# Patient Record
Sex: FEMALE | Race: WHITE
[De-identification: names, ages, dates, MRNs, and addresses within clinical notes are randomized per-mention and may not be internally consistent; named-entity substitution may affect disease eponyms.]

---
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
## 2022-01-08 ENCOUNTER — HOSPITAL ENCOUNTER (EMERGENCY)
Dept: HOSPITAL 47 - EC | Age: 22
Discharge: HOME | End: 2022-01-08
Payer: COMMERCIAL

## 2022-01-08 VITALS
TEMPERATURE: 98.7 F | RESPIRATION RATE: 17 BRPM | HEART RATE: 97 BPM | DIASTOLIC BLOOD PRESSURE: 88 MMHG | SYSTOLIC BLOOD PRESSURE: 129 MMHG

## 2022-01-08 DIAGNOSIS — O26.892: ICD-10-CM

## 2022-01-08 DIAGNOSIS — J45.909: ICD-10-CM

## 2022-01-08 DIAGNOSIS — O98.512: Primary | ICD-10-CM

## 2022-01-08 DIAGNOSIS — U07.1: ICD-10-CM

## 2022-01-08 DIAGNOSIS — Z91.018: ICD-10-CM

## 2022-01-08 DIAGNOSIS — Z3A.26: ICD-10-CM

## 2022-01-08 DIAGNOSIS — M54.9: ICD-10-CM

## 2022-01-08 DIAGNOSIS — O99.512: ICD-10-CM

## 2022-01-08 PROCEDURE — 99283 EMERGENCY DEPT VISIT LOW MDM: CPT

## 2022-01-08 PROCEDURE — 87635 SARS-COV-2 COVID-19 AMP PRB: CPT

## 2022-01-08 NOTE — ED
URI HPI





- General


Chief Complaint: Upper Respiratory Infection


Stated Complaint: Upper Respiratory Symptoms, Back Pain


Time Seen by Provider: 01/08/22 19:59


Source: patient, RN notes reviewed


Mode of arrival: ambulatory


Limitations: no limitations





- History of Present Illness


Initial Comments: 





Patient is a 21-year-old female that presents to the emergency department 

complaining of upper respiratory tract symptoms for several days.  She notes she

is approximately 26 weeks pregnant and wanted to get tested for Covid.  She was 

well-appearing stated that her symptoms are very mild.  She was otherwise in no 

distress.  She denied any chest pain shortness of breath headache nausea 

vomiting diarrhea constipation fatigue chills.





- Related Data


                                Home Medications











 Medication  Instructions  Recorded  Confirmed


 


Pnv,Calcium 72/Iron/Folic Acid 1 tab PO HS 04/18/21 12/23/21





[Prenatal Plus Tablet]   


 


Ondansetron [Zofran ODT] 4 mg PO TID PRN 10/07/21 12/23/21











                                    Allergies











Allergy/AdvReac Type Severity Reaction Status Date / Time


 


poppyseed oil Allergy  Dyspnea Verified 01/08/22 18:39














Review of Systems


ROS Statement: 


Those systems with pertinent positive or pertinent negative responses have been 

documented in the HPI.





ROS Other: All systems not noted in ROS Statement are negative.





Past Medical History


Past Medical History: Asthma, GERD/Reflux, Musculoskeletal Disorder, Osteoart

hritis (OA), Pneumonia


Additional Past Medical History / Comment(s): Hx Pneumonia. Degenerative Disc 

Disease.


History of Any Multi-Drug Resistant Organisms: None Reported


Past Surgical History: Adenoidectomy, Cholecystectomy, Tonsillectomy


Additional Past Surgical History / Comment(s): [place] teeth removed, ganglion 

cyst on left hand removed. D&C


Past Anesthesia/Blood Transfusion Reactions: Postoperative Nausea & Vomiting 

(PONV)


Past Psychological History: No Psychological Hx Reported


Smoking Status: Never smoker


Past Alcohol Use History: None Reported


Past Drug Use History: None Reported





- Past Family History


  ** Mother


Family Medical History: No Reported History





General Exam


Limitations: no limitations


General appearance: alert, in no apparent distress


Head exam: Present: atraumatic, normocephalic, normal inspection


Eye exam: Present: normal appearance, PERRL, EOMI.  Absent: scleral icterus, 

conjunctival injection, periorbital swelling


ENT exam: Present: normal exam, mucous membranes moist


Neck exam: Present: normal inspection


Respiratory exam: Present: normal lung sounds bilaterally.  Absent: respiratory 

distress, wheezes, rales, rhonchi, stridor


Cardiovascular Exam: Present: regular rate, normal rhythm, normal heart sounds. 

Absent: systolic murmur, diastolic murmur, rubs, gallop, clicks


Extremities exam: Present: normal inspection, full ROM, normal capillary refill.

 Absent: tenderness, pedal edema, joint swelling, calf tenderness


Neurological exam: Present: alert, oriented X3


Psychiatric exam: Present: normal affect, normal mood


Skin exam: Present: warm, dry, intact, normal color.  Absent: rash





Course





                                   Vital Signs











  01/08/22





  18:37


 


Temperature 97.8 F


 


Pulse Rate 92


 


Respiratory 20





Rate 


 


Blood Pressure 114/75


 


O2 Sat by Pulse 100





Oximetry 














Medical Decision Making





- Medical Decision Making





21-year-old female complaining of upper respiratory tract symptoms for several 

days.


Covid test ordered and is positive.


Patient does meet criteria due to pregnancy.


Patient was informed monoclonal antibodies and wishes to go home and think on 

it.  She notes she is tired and wants to rest for tonight.


Case discussed with Dr. Valdez.





- Lab Data





                                   Lab Results











  01/08/22 Range/Units





  18:40 


 


Coronavirus (PCR)  Detected A  (Not Detectd)  














Disposition


Clinical Impression: 


 COVID





Disposition: HOME SELF-CARE


Condition: Stable


Instructions (If sedation given, give patient instructions):  Coronavirus 

Disease 2019 (COVID-19)


Additional Instructions: 


Please return to the Emergency Department if symptoms worsen or any other 

concerns.


Follow-up with primary care 1-2 days.


Please return if you do decide to do monoclonal antibodies.





Is patient prescribed a controlled substance at d/c from ED?: No


Referrals: 


Damian Roldan DO [Primary Care Provider] - 1-2 days


Time of Disposition: 20:12

## 2022-01-12 ENCOUNTER — HOSPITAL ENCOUNTER (OUTPATIENT)
Dept: HOSPITAL 47 - FBPOP | Age: 22
Discharge: HOME | End: 2022-01-12
Attending: OBSTETRICS & GYNECOLOGY
Payer: COMMERCIAL

## 2022-01-12 VITALS
HEART RATE: 83 BPM | TEMPERATURE: 96.9 F | RESPIRATION RATE: 16 BRPM | SYSTOLIC BLOOD PRESSURE: 131 MMHG | DIASTOLIC BLOOD PRESSURE: 74 MMHG

## 2022-01-12 DIAGNOSIS — Z3A.26: ICD-10-CM

## 2022-01-12 DIAGNOSIS — O26.892: Primary | ICD-10-CM

## 2022-01-12 LAB
PH UR: 6.5 [PH] (ref 5–8)
RBC UR QL: 1 /HPF (ref 0–5)
SP GR UR: 1.01 (ref 1–1.03)
SQUAMOUS UR QL AUTO: 1 /HPF (ref 0–4)
UROBILINOGEN UR QL STRIP: <2 MG/DL (ref ?–2)
WBC #/AREA URNS HPF: 2 /HPF (ref 0–5)

## 2022-01-12 PROCEDURE — 99213 OFFICE O/P EST LOW 20 MIN: CPT

## 2022-01-12 PROCEDURE — 81001 URINALYSIS AUTO W/SCOPE: CPT

## 2022-01-21 ENCOUNTER — HOSPITAL ENCOUNTER (OUTPATIENT)
Dept: HOSPITAL 47 - FBPOP | Age: 22
Discharge: HOME | End: 2022-01-21
Attending: OBSTETRICS & GYNECOLOGY
Payer: COMMERCIAL

## 2022-01-21 VITALS
RESPIRATION RATE: 16 BRPM | SYSTOLIC BLOOD PRESSURE: 127 MMHG | DIASTOLIC BLOOD PRESSURE: 77 MMHG | TEMPERATURE: 98 F | HEART RATE: 90 BPM

## 2022-01-21 DIAGNOSIS — H53.40: ICD-10-CM

## 2022-01-21 DIAGNOSIS — O99.891: Primary | ICD-10-CM

## 2022-01-21 DIAGNOSIS — Z3A.28: ICD-10-CM

## 2022-01-21 LAB
PH UR: 7 [PH] (ref 5–8)
RBC UR QL: <1 /HPF (ref 0–5)
SP GR UR: 1.02 (ref 1–1.03)
SQUAMOUS UR QL AUTO: 6 /HPF (ref 0–4)
UROBILINOGEN UR QL STRIP: <2 MG/DL (ref ?–2)
WBC #/AREA URNS HPF: 1 /HPF (ref 0–5)

## 2022-01-21 PROCEDURE — 59025 FETAL NON-STRESS TEST: CPT

## 2022-01-21 PROCEDURE — 99213 OFFICE O/P EST LOW 20 MIN: CPT

## 2022-01-21 PROCEDURE — 81001 URINALYSIS AUTO W/SCOPE: CPT

## 2022-01-27 NOTE — P.MSEPDOC
Presenting Problems





- Arrival Data


Date of Arrival on Unit: 22


Time of Arrival on Unit: 19:39


Mode of Transport: Ambulatory





- Complaint


OB-Reason for Admission/Chief Complaint: Pain


Comment: Lower abdominal pain (ligament)





Prenatal Medical History





- Pregnancy Information


: 2


Para: 0


Term: 0


: 0


Abortions: Spontaneous or Elective: 1


Number of Living Children: 0





- Gestational Age


Gestational Age by TOM (wks/days): 27 Weeks and 0 Days





- Prenatal History


Comment: Covid + on 22 had antibody infusion and history of SVT





Review of Systems





- Review of Systems


Constitutional: No problems


Breast: No problems


ENT: Cough, Nasal congestion


Cardiovascular: Irregular heartbeat


Respiratory: No problems


Gastrointestinal: No problems


Genitourinary: No problems


Musculoskeletal: No problems


Neurological: No problems


Skin: No problems





Vital Signs





- Temperature


Temperature: 96.9 F


Temperature Source: Temporal Artery Scan





- Pulse


  ** Radial


Pulse Rate: 83


Pulse Assessment Method: Automatic Cuff





- Respirations


Respiratory Rate: 16


Oxygen Delivery Method: Room Air


O2 Sat by Pulse Oximetry: 99





- Blood Pressure


  ** Right Arm Supine


Blood Pressure: 131/74


Blood Pressure Mean: 93


Blood Pressure Source: Automatic Cuff





Medical Screen Scoring





- Cervical Exam


Dilation (cm): 0


Effacement (%): 50


Station: -4


Membranes: Intact





- Fetal Assessment - Baby A


Baseline FHR: 135


Fetal Heart Rate - NICHD Category: Category I (Normal)


NST: Reactive





Physician Notification





- Physician Notified


Physician Notified Date: 22


Physician Notified Time: 20:25


Physician: Hipolito Alcaraz Order Received: Yes





- Notification Comment


Comment: Obtain FFN





Maternal Fetal Triage Index





- Maternal Fetal Triage Index


Presenting for scheduled procedure w/no complaint: No





- Stat/Priority 1


Stat Priority 1: No





- Urgent/Priority 2


Urgent Priority 2: No





- Prompt/Priority 3


Prompt Priority 3: No





- Non-Urgent/Priority 4


Non-Urgent Priority 4: Yes


Criteria Met for Priority 4: Common discomfort of pregnancy ligament pain





Disposition





- Disposition


OB Disposition: Discharge to home


Discharge Date: 22


Discharge Time: 20:50


I agree with the RN Medical Screening Exam: Yes


Case reviewed; plan agreed upon as documented in EMR&OBIX.: Yes


Diagnosis: PREG CARE FOR PATIENT W RECUR PREG LOSS, SECOND TRIMESTER

## 2022-01-30 ENCOUNTER — HOSPITAL ENCOUNTER (OUTPATIENT)
Dept: HOSPITAL 47 - FBPOP | Age: 22
Discharge: HOME | End: 2022-01-30
Attending: OBSTETRICS & GYNECOLOGY
Payer: COMMERCIAL

## 2022-01-30 VITALS
DIASTOLIC BLOOD PRESSURE: 75 MMHG | HEART RATE: 94 BPM | SYSTOLIC BLOOD PRESSURE: 122 MMHG | TEMPERATURE: 98.1 F | RESPIRATION RATE: 16 BRPM

## 2022-01-30 DIAGNOSIS — O26.853: Primary | ICD-10-CM

## 2022-01-30 DIAGNOSIS — Z3A.29: ICD-10-CM

## 2022-01-30 PROCEDURE — 99213 OFFICE O/P EST LOW 20 MIN: CPT

## 2022-01-30 PROCEDURE — 59025 FETAL NON-STRESS TEST: CPT

## 2022-01-31 NOTE — P.MSEPDOC
Presenting Problems





- Arrival Data


Date of Arrival on Unit: 22


Time of Arrival on Unit: 17:15


Mode of Transport: Ambulatory





- Complaint


OB-Reason for Admission/Chief Complaint: Other


Comment: mucusy dischagre. "bleeding" after sharp pain.





Prenatal Medical History





- Pregnancy Information


: 2


Para: 0


Term: 0


: 0


Abortions: Spontaneous or Elective: 0


Number of Living Children: 0





- Gestational Age


Gestational Age by TOM (wks/days): 29 Weeks and 4 Days





Review of Systems





- Review of Systems


Constitutional: No problems


Breast: No problems


ENT: No problems


Cardiovascular: No problems


Respiratory: No problems


Gastrointestinal: No problems


Genitourinary: No problems


Musculoskeletal: No problems


Neurological: No problems


Skin: No problems


Comment: history of svt's to see cardialogy in march





Vital Signs





- Temperature


Temperature: 98.1 F


Temperature Source: Temporal Artery Scan





- Pulse


  ** Apical


Pulse Rate: 94


Pulse Assessment Method: Automatic Cuff





- Respirations


Respiratory Rate: 16


Oxygen Delivery Method: Room Air


O2 Sat by Pulse Oximetry: 98





- Blood Pressure


  ** Right Arm


Blood Pressure: 122/75


Blood Pressure Mean: 90


Blood Pressure Source: Automatic Cuff





Medical Screen Scoring





- Cervical Exam


Dilation (cm): 0





- Uterine Contractions


Frequency From (mins): 0





- Fetal Assessment - Baby A


Baseline FHR: 140


Fetal Heart Rate - NICHD Category: Category I (Normal)





Physician Notification





- Physician Notified


Physician Notified Date: 22


Physician Notified Time: 18:15


Physician: Yuliya Howe Order Received: Yes





- Notification Comment


Comment: may discharge home with bed rest. seeing dr handley in am. to return if 

conserns or problems





Maternal Fetal Triage Index





- Non-Urgent/Priority 4


Non-Urgent Priority 4: Yes


Criteria Met for Priority 4:  called dr howe report given on pt c/o. 

brownish mucus pad brought in. size of quarter. abd soft non tender to touch. no

bleeding noted. no leaking fluid. vag. exam= closed thick posterior. no 

discharge noted on glove. reactive nst.





Disposition





- Disposition


OB Disposition: Physician follow up in office, Discharge to home, Written follow

up instructions reviewed


Discharge Date: 22


Discharge Time: 18:20


I agree with the RN Medical Screening Exam: Yes


Case reviewed; plan agreed upon as documented in EMR&OBIX.: Yes


Diagnosis: SPOTTING COMPLICATING PREGNANCY, THIRD TRIMESTER

## 2022-02-03 NOTE — P.MSEPDOC
Presenting Problems





- Arrival Data


Date of Arrival on Unit: 22


Time of Arrival on Unit: 12:51


Mode of Transport: Ambulatory





- Complaint


OB-Reason for Admission/Chief Complaint: Visual Disturbances





Prenatal Medical History





- Pregnancy Information


: 3


Para: 0


Term: 0


: 0


Abortions: Spontaneous or Elective: 0


Number of Living Children: 0





- Gestational Age


Gestational Age by TOM (wks/days): 28 Weeks and 2 Days





Review of Systems





- Review of Systems


Constitutional: No problems


Breast: No problems


ENT: No problems


Cardiovascular: No problems


Respiratory: No problems


Gastrointestinal: No problems


Genitourinary: No problems


Musculoskeletal: No problems


Neurological: No problems


Skin: No problems





Vital Signs





- Temperature


Temperature: 98.0 F


Temperature Source: Oral





- Pulse


  ** Right Brachial


Pulse Rate: 90


Pulse Assessment Method: Automatic Cuff





- Respirations


Respiratory Rate: 16


Oxygen Delivery Method: Room Air





- Blood Pressure


  ** Right Arm


Blood Pressure: 127/77


Blood Pressure Mean: 93


Blood Pressure Source: Automatic Cuff





Medical Screen Scoring





- Fetal Assessment - Baby A


Baseline FHR: 150


Fetal Heart Rate - NICHD Category: Category I (Normal)


NST: Reactive





Physician Notification





- Physician Notified


Physician Notified Date: 22


Physician Notified Time: 13:12


Physician: Maribel Asencio


New Order Received: Yes





- Notification Comment


Comment: Ordered UA- WNL





Maternal Fetal Triage Index





- Non-Urgent/Priority 4


Non-Urgent Priority 4: Yes


Criteria Met for Priority 4: Visual changes unrelated to [place]





Disposition





- Disposition


OB Disposition: Discharge to home


Discharge Date: 22


Discharge Time: 14:09


I agree with the RN Medical Screening Exam: Yes


Case reviewed; plan agreed upon as documented in EMR&OBIX.: Yes


Diagnosis: OTHER LOCALIZED VISUAL FIELD DEFECT, UNSPECIFIED EYE

## 2022-02-06 ENCOUNTER — HOSPITAL ENCOUNTER (EMERGENCY)
Dept: HOSPITAL 47 - EC | Age: 22
Discharge: HOME | End: 2022-02-06
Payer: COMMERCIAL

## 2022-02-06 VITALS — RESPIRATION RATE: 18 BRPM

## 2022-02-06 VITALS — HEART RATE: 81 BPM | DIASTOLIC BLOOD PRESSURE: 66 MMHG | TEMPERATURE: 98.9 F | SYSTOLIC BLOOD PRESSURE: 109 MMHG

## 2022-02-06 DIAGNOSIS — Z79.83: ICD-10-CM

## 2022-02-06 DIAGNOSIS — Z91.018: ICD-10-CM

## 2022-02-06 DIAGNOSIS — J20.9: Primary | ICD-10-CM

## 2022-02-06 DIAGNOSIS — J45.909: ICD-10-CM

## 2022-02-06 DIAGNOSIS — K21.9: ICD-10-CM

## 2022-02-06 PROCEDURE — 99284 EMERGENCY DEPT VISIT MOD MDM: CPT

## 2022-02-06 PROCEDURE — 71046 X-RAY EXAM CHEST 2 VIEWS: CPT

## 2022-02-06 NOTE — ED
URI HPI





- General


Chief Complaint: Upper Respiratory Infection


Stated Complaint: Cough


Time Seen by Provider: 02/06/22 17:17


Source: patient


Mode of arrival: ambulatory


Limitations: no limitations





- History of Present Illness


Initial Comments: 


22 year-old female patient presents to the emergency department for evaluation 

of cough and chest congestion. Patient states she has been coughing for the last

several days and has had green sputum production. States she feels like it is 

deep in her lungs. Reports mild intermittent shortness of breath. States she 

feels like she is wheezing at times. Denies fever or chills. Denies any 

congestion or nasal drainage. Denies ear pain. States she did have COVID 

diagnosed 1/8/22. States she did recover from this illness then got sick again. 

She is 30 weeks pregnant. No abdominal pain or vaginal bleeding noted. 








- Related Data


                                Home Medications











 Medication  Instructions  Recorded  Confirmed


 


Pnv,Calcium 72/Iron/Folic Acid 1 tab PO HS 04/18/21 01/30/22





[Prenatal Plus Tablet]   


 


Omeprazole [PriLOSEC] 40 mg PO DAILY 01/30/22 01/30/22








                                  Previous Rx's











 Medication  Instructions  Recorded


 


Albuterol Sulfate [Proair Hfa] 1 - 2 puff INHALATION Q6HR PRN 02/06/22





 #8.5 gm 











                                    Allergies











Allergy/AdvReac Type Severity Reaction Status Date / Time


 


poppyseed oil Allergy Severe Anaphylaxis Verified 02/06/22 17:12














Review of Systems


ROS Statement: 


Those systems with pertinent positive or pertinent negative responses have been 

documented in the HPI.





ROS Other: All systems not noted in ROS Statement are negative.





Past Medical History


Past Medical History: Asthma, GERD/Reflux, Musculoskeletal Disorder, 

Osteoarthritis (OA), Pneumonia


Additional Past Medical History / Comment(s): Hx Pneumonia. Degenerative Disc 

Disease.


History of Any Multi-Drug Resistant Organisms: None Reported


Past Surgical History: Adenoidectomy, Cholecystectomy, Tonsillectomy


Additional Past Surgical History / Comment(s): [place] teeth removed, ganglion 

cyst on left hand removed. D&C


Past Anesthesia/Blood Transfusion Reactions: Postoperative Nausea & Vomiting 

(PONV)


Past Psychological History: No Psychological Hx Reported


Smoking Status: Never smoker


Past Alcohol Use History: None Reported


Past Drug Use History: None Reported





- Past Family History


  ** Mother


Family Medical History: No Reported History





General Exam


Limitations: no limitations


General appearance: alert, in no apparent distress, other (This is a well-

developed, well-nourished adult female in no acute distress.)


Eye exam: Present: normal appearance, PERRL, EOMI.  Absent: scleral icterus, 

conjunctival injection, periorbital swelling


ENT exam: Present: normal exam, normal oropharynx, mucous membranes moist


Respiratory exam: Present: normal lung sounds bilaterally.  Absent: respiratory 

distress, wheezes, rales, rhonchi, stridor


Cardiovascular Exam: Present: regular rate, normal rhythm, normal heart sounds. 

Absent: systolic murmur, diastolic murmur, rubs, gallop, clicks


GI/Abdominal exam: Present: soft, normal bowel sounds, other (Gravid).  Absent: 

distended, tenderness, guarding, rebound, rigid


Neurological exam: Present: alert, oriented X3, CN II-XII intact


Psychiatric exam: Present: normal affect, normal mood


Skin exam: Present: warm, dry, intact, normal color.  Absent: rash





Course


                                   Vital Signs











  02/06/22 02/06/22 02/06/22





  17:08 17:18 18:30


 


Temperature 98.0 F  98.9 F


 


Pulse Rate 97  81


 


Respiratory 18 18 18





Rate   


 


Blood Pressure 110/73  109/66


 


O2 Sat by Pulse 99  97





Oximetry   














Medical Decision Making





- Medical Decision Making





22-year-old female patient presented to the emergency department today for 

evaluation of productive cough for the last few days.  Physical examination 

revealed clear equal lung sounds.  Vital signs are normal with good oxygen 

saturation.  Chest x-ray was obtained and was negative.  Patient did have Covid 

at the beginning of January.  She is currently 30 weeks pregnant denying any 

abdominal pain or vaginal bleeding.  Reports normal fetal movement.  She'll be 

given a prescription for pro-air inhaler.  Instructions to continue taking 

Robitussin.  She is also instructed to contact her OB/GYN for further 

instructions.  Return parameters were discussed in detail.  She verbalizes 

understanding and agrees with this plan.  My attending is Dr. De La Cruz. 





- Radiology Data


Radiology results: report reviewed, image reviewed


Two-view x-ray of the chest is obtained.  Report was reviewed in its entirety.  

Impression by Dr. Griffith shows no acute cardiopulmonary process.





Disposition


Clinical Impression: 


 Acute bronchitis





Disposition: HOME SELF-CARE


Condition: Good


Instructions (If sedation given, give patient instructions):  Acute Bronchitis 

(ED)


Additional Instructions: 


Some medications you may try are over-the-counter that are safe in pregnancy:





Cough drops and lozenges (For sore throat)


Tylenol (For pain and fever)


Robitussin (for cough)





Increase fluids. Get adequate rest. Follow up with your primary care physician 

or OBGYN for recheck as soon as possible. 


Prescriptions: 


Albuterol Sulfate [Proair Hfa] 1 - 2 puff INHALATION Q6HR PRN #8.5 gm


 PRN Reason: Shortness Of Breath


Is patient prescribed a controlled substance at d/c from ED?: No


Referrals: 


Damian Roldan DO [Primary Care Provider] - 1-2 days


Time of Disposition: 18:14

## 2022-02-06 NOTE — XR
EXAMINATION TYPE: XR chest 2V

 

DATE OF EXAM: 2/6/2022

 

COMPARISON: Chest radiograph 12/19/2019

 

HISTORY: Cough

 

TECHNIQUE:  Frontal and lateral views of the chest are obtained.

 

FINDINGS:  There is no focal air space opacity, pleural effusion, or pneumothorax seen.  The cardiac 
silhouette size is within normal limits.   The osseous structures are intact.

 

IMPRESSION:  No acute cardiopulmonary process.

## 2022-02-23 ENCOUNTER — HOSPITAL ENCOUNTER (OUTPATIENT)
Dept: HOSPITAL 47 - RADUSWWP | Age: 22
End: 2022-02-23
Attending: OBSTETRICS & GYNECOLOGY
Payer: COMMERCIAL

## 2022-02-23 DIAGNOSIS — Z3A.32: ICD-10-CM

## 2022-02-23 DIAGNOSIS — O36.63X0: Primary | ICD-10-CM

## 2022-02-23 PROCEDURE — 76805 OB US >/= 14 WKS SNGL FETUS: CPT

## 2022-02-23 NOTE — US
EXAMINATION TYPE: US OB >= 14 wk fetus

 

DATE OF EXAM: 2/23/2022

 

COMPARISON: None

 

CLINICAL HISTORY: O36.63X0 LARGE FOR DATES Growth scan

 

TECHNIQUE:  Transabdominal (TA)

 

GESTATIONAL AGE / DATING

Physician Established: (33 weeks/0 days) 

** EDC:  4/13/2022

Dates by Current Scan: (32weeks/0 days) 

** EDC: 4/20/2022

Beta HCG (if available): Not available at this time

 

FETAL SURVEY

IUP:  Single

PLACENTA: Posterior right lateral

PREVIA:  No Previa

** ESPERANZA: 11.9 Normal

CERVICAL LENGTH (transabdominal: norm > 3.0cm): 3.4cm

 

FETAL BIOMETRY

PRESENTATION:  Vertex

BPD: 7.9 cm

**  31 weeks / 6 days

HC: 30.1 cm

**  33 weeks / 2 days

AC: 27.9 cm

**  31 weeks / 6 days

FL: 6.2 cm

**  32 weeks / 1 days

ESTIMATED FETAL WEIGHT IN GRAMS:  1907 grams

ESTIMATED FETAL WEIGHT IN LBS/OZ:  4 lbs. 3 oz. 

WEIGHT PERCENTAGE BASED ON ESTABLISHED DATES:   17.6%

HC/AC: 1.1 Normal

FL/AC: 22.3 Normal 

HEART RATE:  163 bpm 

RHYTHM:  Normal

 

Single live IUP measuring 32 weeks 0 days

 

 

 

IMPRESSION:

Single intrauterine gestation estimated at 32 weeks 0 days gestation. Cardiac activity measures 163 b
pm.

## 2022-03-01 ENCOUNTER — HOSPITAL ENCOUNTER (OUTPATIENT)
Dept: HOSPITAL 47 - FBPOP | Age: 22
Discharge: HOME | End: 2022-03-01
Attending: OBSTETRICS & GYNECOLOGY
Payer: COMMERCIAL

## 2022-03-01 VITALS
HEART RATE: 80 BPM | TEMPERATURE: 97.3 F | DIASTOLIC BLOOD PRESSURE: 79 MMHG | SYSTOLIC BLOOD PRESSURE: 130 MMHG | RESPIRATION RATE: 15 BRPM

## 2022-03-01 DIAGNOSIS — Z3A.33: ICD-10-CM

## 2022-03-01 DIAGNOSIS — O47.03: Primary | ICD-10-CM

## 2022-03-01 PROCEDURE — 59025 FETAL NON-STRESS TEST: CPT

## 2022-03-01 PROCEDURE — 99213 OFFICE O/P EST LOW 20 MIN: CPT

## 2022-03-01 PROCEDURE — 84112 EVAL AMNIOTIC FLUID PROTEIN: CPT

## 2022-03-03 NOTE — P.MSEPDOC
Presenting Problems





- Arrival Data


Date of Arrival on Unit: 22


Time of Arrival on Unit: 15:40


Mode of Transport: Ambulatory





- Complaint


OB-Reason for Admission/Chief Complaint: Rule Out PROM, Other


Comment: tailbone pain





Prenatal Medical History





- Pregnancy Information


: 2


Para: 0


Term: 0


: 0


Abortions: Spontaneous or Elective: 1


Number of Living Children: 0





- Gestational Age


Gestational Age by TOM (wks/days): 33 Weeks and 6 Days





Review of Systems





- Review of Systems


Constitutional: No problems


Breast: No problems


ENT: No problems


Cardiovascular: No problems


Respiratory: No problems


Gastrointestinal: No problems


Genitourinary: No problems


Musculoskeletal: No problems


Neurological: No problems


Skin: No problems





Vital Signs





- Temperature


Temperature: 97.3 F


Temperature Source: Temporal Artery Scan





- Pulse


  ** Brachial


Pulse Rate: 80


Pulse Assessment Method: Automatic Cuff





- Respirations


Respiratory Rate: 15


Oxygen Delivery Method: Room Air


O2 Sat by Pulse Oximetry: 97





- Blood Pressure


  ** Right Arm Sitting


Blood Pressure: 130/79


Blood Pressure Mean: 96


Blood Pressure Source: Automatic Cuff





Medical Screen Scoring





- Cervical Exam


Dilation (cm): 0


Membranes: Intact





- Fetal Assessment - Baby A


Baseline FHR: 135


Fetal Heart Rate - NICHD Category: Category I (Normal)


NST: Reactive





Physician Notification





- Physician Notified


Physician Notified Date: 22


Physician Notified Time: 16:15


Physician: Prosper Barillas Order Received: Yes





- Notification Comment


Comment: Amnisure neg, cervix closed, NST reactive.  Orders received to dc pt 

home and have her f/u with Dr Asencio





Maternal Fetal Triage Index





- Maternal Fetal Triage Index


Presenting for scheduled procedure w/no complaint: No





- Stat/Priority 1


Stat Priority 1: No





- Urgent/Priority 2


Urgent Priority 2: Yes


Provider Notified: Dr Barillas


Provider Notified Time: 16:15


Criteria Met for Priority 2: 33 6/7 weeks c/o possible leaking of fluid





Disposition





- Disposition


OB Disposition: Triage, Discharge to home, Written follow up instructions 

reviewed


Discharge Date: 22


Discharge Time: 16:20


I agree with the RN Medical Screening Exam: Yes


Case reviewed; plan agreed upon as documented in EMR&OBIX.: Yes


Diagnosis: FALSE LABOR BEFORE 37 COMPLETED WEEKS OF GEST, THIRD TRI

## 2022-03-05 ENCOUNTER — HOSPITAL ENCOUNTER (OUTPATIENT)
Dept: HOSPITAL 47 - FBPOP | Age: 22
Discharge: HOME | End: 2022-03-05
Attending: OBSTETRICS & GYNECOLOGY
Payer: COMMERCIAL

## 2022-03-05 VITALS
TEMPERATURE: 97.4 F | RESPIRATION RATE: 16 BRPM | SYSTOLIC BLOOD PRESSURE: 136 MMHG | DIASTOLIC BLOOD PRESSURE: 90 MMHG | HEART RATE: 82 BPM

## 2022-03-05 DIAGNOSIS — O26.93: Primary | ICD-10-CM

## 2022-03-05 DIAGNOSIS — Z3A.34: ICD-10-CM

## 2022-03-05 LAB
ALT SERPL-CCNC: 16 U/L (ref 4–34)
AST SERPL-CCNC: 21 U/L (ref 14–36)
BASOPHILS # BLD AUTO: 0.1 K/UL (ref 0–0.2)
BASOPHILS NFR BLD AUTO: 0 %
BUN SERPL-SCNC: 7 MG/DL (ref 7–17)
EOSINOPHIL # BLD AUTO: 0.2 K/UL (ref 0–0.7)
EOSINOPHIL NFR BLD AUTO: 2 %
ERYTHROCYTE [DISTWIDTH] IN BLOOD BY AUTOMATED COUNT: 3.68 M/UL (ref 3.8–5.4)
ERYTHROCYTE [DISTWIDTH] IN BLOOD: 13 % (ref 11.5–15.5)
HCT VFR BLD AUTO: 36.2 % (ref 34–46)
HGB BLD-MCNC: 12.3 GM/DL (ref 11.4–16)
LDH SPEC-CCNC: 491 U/L (ref 313–618)
LYMPHOCYTES # SPEC AUTO: 2.1 K/UL (ref 1–4.8)
LYMPHOCYTES NFR SPEC AUTO: 14 %
MCH RBC QN AUTO: 33.6 PG (ref 25–35)
MCHC RBC AUTO-ENTMCNC: 34.1 G/DL (ref 31–37)
MCV RBC AUTO: 98.5 FL (ref 80–100)
MONOCYTES # BLD AUTO: 0.8 K/UL (ref 0–1)
MONOCYTES NFR BLD AUTO: 5 %
NEUTROPHILS # BLD AUTO: 11.8 K/UL (ref 1.3–7.7)
NEUTROPHILS NFR BLD AUTO: 78 %
PH UR: 6.5 [PH] (ref 5–8)
PLATELET # BLD AUTO: 242 K/UL (ref 150–450)
PROT/CREAT UR-RTO: 0.07
SP GR UR: 1.02 (ref 1–1.03)
URATE SERPL-MCNC: 5.6 MG/DL (ref 3.7–7.4)
UROBILINOGEN UR QL STRIP: <2 MG/DL (ref ?–2)
WBC # BLD AUTO: 15 K/UL (ref 4–11)

## 2022-03-05 PROCEDURE — 85025 COMPLETE CBC W/AUTO DIFF WBC: CPT

## 2022-03-05 PROCEDURE — 59025 FETAL NON-STRESS TEST: CPT

## 2022-03-05 PROCEDURE — 82565 ASSAY OF CREATININE: CPT

## 2022-03-05 PROCEDURE — 84156 ASSAY OF PROTEIN URINE: CPT

## 2022-03-05 PROCEDURE — 81003 URINALYSIS AUTO W/O SCOPE: CPT

## 2022-03-05 PROCEDURE — 84450 TRANSFERASE (AST) (SGOT): CPT

## 2022-03-05 PROCEDURE — 83615 LACTATE (LD) (LDH) ENZYME: CPT

## 2022-03-05 PROCEDURE — 84550 ASSAY OF BLOOD/URIC ACID: CPT

## 2022-03-05 PROCEDURE — 84520 ASSAY OF UREA NITROGEN: CPT

## 2022-03-05 PROCEDURE — 99215 OFFICE O/P EST HI 40 MIN: CPT

## 2022-03-05 PROCEDURE — 84460 ALANINE AMINO (ALT) (SGPT): CPT

## 2022-03-05 PROCEDURE — 82570 ASSAY OF URINE CREATININE: CPT

## 2022-03-06 NOTE — P.MSEPDOC
Presenting Problems





- Arrival Data


Date of Arrival on Unit: 22


Time of Arrival on Unit: 21:15


Mode of Transport: Ambulatory





- Complaint


OB-Reason for Admission/Chief Complaint: Other


Comment: c/o low back pain, hand swelling, and headache





Prenatal Medical History





- Pregnancy Information


: 2


Para: 0


Term: 0


: 0


Abortions: Spontaneous or Elective: 0


Number of Living Children: 0





- Gestational Age


Gestational Age by TOM (wks/days): 34 Weeks and 3 Days





- Prenatal History


Comment: SVT





Review of Systems





- Review of Systems


Constitutional: No problems


Breast: No problems


ENT: No problems


Cardiovascular: No problems


Respiratory: No problems


Gastrointestinal: No problems


Genitourinary: No problems


Musculoskeletal: No problems


Neurological: No problems


Skin: No problems





Vital Signs





- Temperature


Temperature: 97.4 F


Temperature Source: Temporal Artery Scan





- Pulse


  ** Pulse Oximetery


Pulse Rate: 82


Pulse Assessment Method: Pulse Oximetry





- Respirations


Respiratory Rate: 16


Oxygen Delivery Method: Room Air


O2 Sat by Pulse Oximetry: 98





- Blood Pressure


  ** Right Arm


Blood Pressure: 136/90


Blood Pressure Mean: 105


Blood Pressure Source: Automatic Cuff





Medical Screen Scoring





- Fetal Assessment - Baby A


Baseline FHR: 130


Fetal Heart Rate - NICHD Category: Category I (Normal)


NST: Reactive





Physician Notification





- Physician Notified


Physician Notified Date: 22


Physician Notified Time: 21:42


Physician: Prosper Barillas


New Order Received: Yes





- Notification Comment


Comment: Dr. Barillas called, report given on maternal complaints of low back 

pain, HA,.  and hand swelling since yesterday. Pt states she also takes her BP 

at home due to having.  SVT and her BPs have been higher than normal for her. 

Initial BPs 136/90 and 134/88,.  some slight edema in the hands noted. NST 

reactive and no contractions graphing on the.  monitor. Orders to obtain serial 

BPs and draw PIH labs. Call back with results.   - Dr. Barillas calling unit.

He looked up lab results and BPs from home and.  reports all as WNL. If Liver 

enzymes come back WNL, pt can be discharged home with.  instructions to follow 

up with Dr. Asencio on monday or tuesday.





Maternal Fetal Triage Index





- Maternal Fetal Triage Index


Presenting for scheduled procedure w/no complaint: No





- Stat/Priority 1


Stat Priority 1: No





- Urgent/Priority 2


Urgent Priority 2: No





- Prompt/Priority 3


Prompt Priority 3: Yes


Criteria Met for Priority 3: 34 3/7 weeks, low back pain, HA, hand swelling, BP 

136/90





Disposition





- Disposition


OB Disposition: Discharge to home


Discharge Date: 22


Discharge Time: 22:55


I agree with the RN Medical Screening Exam: Yes


Case reviewed; plan agreed upon as documented in EMR&OBIX.: Yes


Diagnosis: PREGNANCY RELATED CONDITIONS, UNSPECIFIED, THIRD TRIMESTER (Patient 

presents to labor and delivery with various complaints including some pressure 

elevations at home.  Initial blood pressure here is mildly elevated however this

is not reproducible or persistent.  I also reviewed the patient's chart and 

appear she's had similar blood pressure with Dr. Asencio in the office.  

Preeclampsia blood work is negative.  Fetal heart tones are category 1.  This 

point there is no evidence of maternal or fetal compromise therefore she is felt

to be stable for discharge home follow up with Dr. Asencio Monday or Tuesday for 

a blood pressure check.)

## 2022-03-07 ENCOUNTER — HOSPITAL ENCOUNTER (OUTPATIENT)
Dept: HOSPITAL 47 - FBPOP | Age: 22
Discharge: HOME | End: 2022-03-07
Attending: OBSTETRICS & GYNECOLOGY
Payer: COMMERCIAL

## 2022-03-07 VITALS
TEMPERATURE: 97.8 F | SYSTOLIC BLOOD PRESSURE: 146 MMHG | DIASTOLIC BLOOD PRESSURE: 90 MMHG | RESPIRATION RATE: 18 BRPM | HEART RATE: 107 BPM

## 2022-03-07 DIAGNOSIS — Z3A.34: ICD-10-CM

## 2022-03-07 DIAGNOSIS — O13.3: Primary | ICD-10-CM

## 2022-03-07 LAB
ALT SERPL-CCNC: 16 U/L (ref 4–34)
AST SERPL-CCNC: 21 U/L (ref 14–36)
BASOPHILS # BLD AUTO: 0 K/UL (ref 0–0.2)
BASOPHILS NFR BLD AUTO: 0 %
BUN SERPL-SCNC: 7 MG/DL (ref 7–17)
EOSINOPHIL # BLD AUTO: 0.3 K/UL (ref 0–0.7)
EOSINOPHIL NFR BLD AUTO: 2 %
ERYTHROCYTE [DISTWIDTH] IN BLOOD BY AUTOMATED COUNT: 3.73 M/UL (ref 3.8–5.4)
ERYTHROCYTE [DISTWIDTH] IN BLOOD: 13.1 % (ref 11.5–15.5)
HCT VFR BLD AUTO: 36.8 % (ref 34–46)
HGB BLD-MCNC: 12.9 GM/DL (ref 11.4–16)
LDH SPEC-CCNC: 457 U/L (ref 313–618)
LYMPHOCYTES # SPEC AUTO: 1.8 K/UL (ref 1–4.8)
LYMPHOCYTES NFR SPEC AUTO: 13 %
MCH RBC QN AUTO: 34.7 PG (ref 25–35)
MCHC RBC AUTO-ENTMCNC: 35.2 G/DL (ref 31–37)
MCV RBC AUTO: 98.6 FL (ref 80–100)
MONOCYTES # BLD AUTO: 0.6 K/UL (ref 0–1)
MONOCYTES NFR BLD AUTO: 4 %
NEUTROPHILS # BLD AUTO: 10.8 K/UL (ref 1.3–7.7)
NEUTROPHILS NFR BLD AUTO: 79 %
PH UR: 6 [PH] (ref 5–8)
PLATELET # BLD AUTO: 248 K/UL (ref 150–450)
PROT/CREAT UR-RTO: 0.04
RBC UR QL: 2 /HPF (ref 0–5)
SP GR UR: 1.02 (ref 1–1.03)
SQUAMOUS UR QL AUTO: 7 /HPF (ref 0–4)
URATE SERPL-MCNC: 5.9 MG/DL (ref 3.7–7.4)
UROBILINOGEN UR QL STRIP: <2 MG/DL (ref ?–2)
WBC # BLD AUTO: 13.7 K/UL (ref 4–11)
WBC #/AREA URNS HPF: 1 /HPF (ref 0–5)

## 2022-03-07 PROCEDURE — 84550 ASSAY OF BLOOD/URIC ACID: CPT

## 2022-03-07 PROCEDURE — 84520 ASSAY OF UREA NITROGEN: CPT

## 2022-03-07 PROCEDURE — 59025 FETAL NON-STRESS TEST: CPT

## 2022-03-07 PROCEDURE — 82570 ASSAY OF URINE CREATININE: CPT

## 2022-03-07 PROCEDURE — 83615 LACTATE (LD) (LDH) ENZYME: CPT

## 2022-03-07 PROCEDURE — 85025 COMPLETE CBC W/AUTO DIFF WBC: CPT

## 2022-03-07 PROCEDURE — 84460 ALANINE AMINO (ALT) (SGPT): CPT

## 2022-03-07 PROCEDURE — 82565 ASSAY OF CREATININE: CPT

## 2022-03-07 PROCEDURE — 84156 ASSAY OF PROTEIN URINE: CPT

## 2022-03-07 PROCEDURE — 84450 TRANSFERASE (AST) (SGOT): CPT

## 2022-03-07 PROCEDURE — 81001 URINALYSIS AUTO W/SCOPE: CPT

## 2022-03-09 ENCOUNTER — HOSPITAL ENCOUNTER (OUTPATIENT)
Dept: HOSPITAL 47 - FBPOP | Age: 22
LOS: 1 days | Discharge: HOME | End: 2022-03-10
Attending: OBSTETRICS & GYNECOLOGY
Payer: COMMERCIAL

## 2022-03-09 DIAGNOSIS — Z3A.35: ICD-10-CM

## 2022-03-09 DIAGNOSIS — O47.03: Primary | ICD-10-CM

## 2022-03-09 PROCEDURE — 85025 COMPLETE CBC W/AUTO DIFF WBC: CPT

## 2022-03-09 PROCEDURE — 85384 FIBRINOGEN ACTIVITY: CPT

## 2022-03-09 PROCEDURE — 84550 ASSAY OF BLOOD/URIC ACID: CPT

## 2022-03-09 PROCEDURE — 84460 ALANINE AMINO (ALT) (SGPT): CPT

## 2022-03-09 PROCEDURE — 99213 OFFICE O/P EST LOW 20 MIN: CPT

## 2022-03-09 PROCEDURE — 84450 TRANSFERASE (AST) (SGOT): CPT

## 2022-03-09 PROCEDURE — 85730 THROMBOPLASTIN TIME PARTIAL: CPT

## 2022-03-09 PROCEDURE — 82565 ASSAY OF CREATININE: CPT

## 2022-03-09 PROCEDURE — 85610 PROTHROMBIN TIME: CPT

## 2022-03-09 PROCEDURE — 59025 FETAL NON-STRESS TEST: CPT

## 2022-03-09 PROCEDURE — 84520 ASSAY OF UREA NITROGEN: CPT

## 2022-03-09 PROCEDURE — 81003 URINALYSIS AUTO W/O SCOPE: CPT

## 2022-03-09 PROCEDURE — 83615 LACTATE (LD) (LDH) ENZYME: CPT

## 2022-03-09 NOTE — P.MSEPDOC
Presenting Problems





- Arrival Data


Date of Arrival on Unit: 22


Time of Arrival on Unit: 11:41


Mode of Transport: Ambulatory





- Complaint


OB-Reason for Admission/Chief Complaint: PIH


Comment: Pt sent from office with orders for PIH eval, elevated BP in office





Prenatal Medical History





- Pregnancy Information


: 3


Para: 0


Term: 0


: 0


Abortions: Spontaneous or Elective: 0


Number of Living Children: 0





- Gestational Age


Gestational Age by TOM (wks/days): 34 Weeks and 5 Days





Review of Systems





- Review of Systems


Constitutional: No problems


Breast: No problems


ENT: No problems


Cardiovascular: No problems


Respiratory: No problems


Gastrointestinal: No problems


Genitourinary: No problems


Musculoskeletal: No problems


Neurological: No problems


Skin: No problems





Vital Signs





- Temperature


Temperature: 97.8 F


Temperature Source: Temporal Artery Scan





- Pulse


  ** Right Sitting Brachial


Pulse Rate: 107


Pulse Assessment Method: Automatic Cuff





- Respirations


Respiratory Rate: 18


Oxygen Delivery Method: Room Air


O2 Sat by Pulse Oximetry: 98





- Blood Pressure


  ** Right Arm Sitting


Blood Pressure: 146/90


Blood Pressure Mean: 108


Blood Pressure Source: Automatic Cuff





Medical Screen Scoring





- Fetal Assessment - Baby A


Baseline FHR: 135


Fetal Heart Rate - NICHD Category: Category I (Normal)


NST: Reactive





Physician Notification





- Physician Notified


Physician Notified Date: 22


Physician Notified Time: 12:45


Physician: Maribel Asencio Order Received: Yes





- Notification Comment


Comment: NST reactive, BP's reviewed, PIH labs WNL. Pt to follow up in the 

office on Friday- pt will call for appt.





Maternal Fetal Triage Index





- Maternal Fetal Triage Index


Presenting for scheduled procedure w/no complaint: Yes





- Scheduled/Requesting Priority 5


Scheduled/Requesting Priority 5: No





Disposition





- Disposition


OB Disposition: Discharge to home


Discharge Date: 22


Discharge Time: 12:50


I agree with the RN Medical Screening Exam: Yes


Case reviewed; plan agreed upon as documented in EMR&OBIX.: Yes


Diagnosis: GESTATIONAL HTN W/O SIGNIFICANT PROTEINURIA, THIRD TRIMESTER

## 2022-03-10 VITALS
RESPIRATION RATE: 17 BRPM | SYSTOLIC BLOOD PRESSURE: 140 MMHG | TEMPERATURE: 97.5 F | DIASTOLIC BLOOD PRESSURE: 93 MMHG | HEART RATE: 81 BPM

## 2022-03-10 LAB
ALT SERPL-CCNC: 19 U/L (ref 4–34)
APTT BLD: 26.3 SEC (ref 22–30)
AST SERPL-CCNC: 27 U/L (ref 14–36)
BASOPHILS # BLD AUTO: 0 K/UL (ref 0–0.2)
BASOPHILS NFR BLD AUTO: 0 %
BUN SERPL-SCNC: 9 MG/DL (ref 7–17)
EOSINOPHIL # BLD AUTO: 0.2 K/UL (ref 0–0.7)
EOSINOPHIL NFR BLD AUTO: 1 %
ERYTHROCYTE [DISTWIDTH] IN BLOOD BY AUTOMATED COUNT: 3.76 M/UL (ref 3.8–5.4)
ERYTHROCYTE [DISTWIDTH] IN BLOOD: 13.5 % (ref 11.5–15.5)
FIBRINOGEN PPP-MCNC: 380 MG/DL (ref 200–500)
HCT VFR BLD AUTO: 37.3 % (ref 34–46)
HGB BLD-MCNC: 12.3 GM/DL (ref 11.4–16)
INR PPP: 0.9 (ref ?–1.2)
LDH SPEC-CCNC: 560 U/L (ref 313–618)
LYMPHOCYTES # SPEC AUTO: 2.3 K/UL (ref 1–4.8)
LYMPHOCYTES NFR SPEC AUTO: 16 %
MCH RBC QN AUTO: 32.8 PG (ref 25–35)
MCHC RBC AUTO-ENTMCNC: 33.1 G/DL (ref 31–37)
MCV RBC AUTO: 99.1 FL (ref 80–100)
MONOCYTES # BLD AUTO: 0.6 K/UL (ref 0–1)
MONOCYTES NFR BLD AUTO: 4 %
NEUTROPHILS # BLD AUTO: 11.1 K/UL (ref 1.3–7.7)
NEUTROPHILS NFR BLD AUTO: 77 %
PH UR: 6.5 [PH] (ref 5–8)
PLATELET # BLD AUTO: 258 K/UL (ref 150–450)
PT BLD: 10.1 SEC (ref 9–12)
SP GR UR: 1.01 (ref 1–1.03)
URATE SERPL-MCNC: 5.5 MG/DL (ref 3.7–7.4)
UROBILINOGEN UR QL STRIP: <2 MG/DL (ref ?–2)
WBC # BLD AUTO: 14.5 K/UL (ref 4–11)

## 2022-03-11 ENCOUNTER — HOSPITAL ENCOUNTER (OUTPATIENT)
Dept: HOSPITAL 47 - FBPOP | Age: 22
Discharge: HOME | End: 2022-03-11
Attending: OBSTETRICS & GYNECOLOGY
Payer: COMMERCIAL

## 2022-03-11 VITALS
TEMPERATURE: 97.5 F | DIASTOLIC BLOOD PRESSURE: 95 MMHG | SYSTOLIC BLOOD PRESSURE: 137 MMHG | RESPIRATION RATE: 18 BRPM | HEART RATE: 101 BPM

## 2022-03-11 DIAGNOSIS — O13.3: Primary | ICD-10-CM

## 2022-03-11 DIAGNOSIS — Z3A.35: ICD-10-CM

## 2022-03-11 LAB
ALT SERPL-CCNC: 20 U/L (ref 4–34)
AST SERPL-CCNC: 26 U/L (ref 14–36)
BASOPHILS # BLD AUTO: 0 K/UL (ref 0–0.2)
BASOPHILS NFR BLD AUTO: 0 %
BUN SERPL-SCNC: 10 MG/DL (ref 7–17)
EOSINOPHIL # BLD AUTO: 0.2 K/UL (ref 0–0.7)
EOSINOPHIL NFR BLD AUTO: 2 %
ERYTHROCYTE [DISTWIDTH] IN BLOOD BY AUTOMATED COUNT: 3.81 M/UL (ref 3.8–5.4)
ERYTHROCYTE [DISTWIDTH] IN BLOOD: 13.5 % (ref 11.5–15.5)
HCT VFR BLD AUTO: 38.1 % (ref 34–46)
HGB BLD-MCNC: 13 GM/DL (ref 11.4–16)
LDH SPEC-CCNC: 533 U/L (ref 313–618)
LYMPHOCYTES # SPEC AUTO: 2 K/UL (ref 1–4.8)
LYMPHOCYTES NFR SPEC AUTO: 16 %
MCH RBC QN AUTO: 34 PG (ref 25–35)
MCHC RBC AUTO-ENTMCNC: 34 G/DL (ref 31–37)
MCV RBC AUTO: 100 FL (ref 80–100)
MONOCYTES # BLD AUTO: 0.5 K/UL (ref 0–1)
MONOCYTES NFR BLD AUTO: 4 %
NEUTROPHILS # BLD AUTO: 9.6 K/UL (ref 1.3–7.7)
NEUTROPHILS NFR BLD AUTO: 77 %
PH UR: 6.5 [PH] (ref 5–8)
PLATELET # BLD AUTO: 272 K/UL (ref 150–450)
RBC UR QL: 1 /HPF (ref 0–5)
SP GR UR: 1.02 (ref 1–1.03)
SQUAMOUS UR QL AUTO: 7 /HPF (ref 0–4)
URATE SERPL-MCNC: 5.9 MG/DL (ref 3.7–7.4)
UROBILINOGEN UR QL STRIP: <2 MG/DL (ref ?–2)
WBC # BLD AUTO: 12.5 K/UL (ref 4–11)
WBC #/AREA URNS HPF: 3 /HPF (ref 0–5)

## 2022-03-11 PROCEDURE — 99215 OFFICE O/P EST HI 40 MIN: CPT

## 2022-03-11 PROCEDURE — 82570 ASSAY OF URINE CREATININE: CPT

## 2022-03-11 PROCEDURE — 84156 ASSAY OF PROTEIN URINE: CPT

## 2022-03-11 PROCEDURE — 82565 ASSAY OF CREATININE: CPT

## 2022-03-11 PROCEDURE — 59025 FETAL NON-STRESS TEST: CPT

## 2022-03-11 PROCEDURE — 84450 TRANSFERASE (AST) (SGOT): CPT

## 2022-03-11 PROCEDURE — 84460 ALANINE AMINO (ALT) (SGPT): CPT

## 2022-03-11 PROCEDURE — 83615 LACTATE (LD) (LDH) ENZYME: CPT

## 2022-03-11 PROCEDURE — 84550 ASSAY OF BLOOD/URIC ACID: CPT

## 2022-03-11 PROCEDURE — 85025 COMPLETE CBC W/AUTO DIFF WBC: CPT

## 2022-03-11 PROCEDURE — 81001 URINALYSIS AUTO W/SCOPE: CPT

## 2022-03-11 PROCEDURE — 84520 ASSAY OF UREA NITROGEN: CPT

## 2022-03-11 PROCEDURE — 84112 EVAL AMNIOTIC FLUID PROTEIN: CPT

## 2022-03-13 ENCOUNTER — HOSPITAL ENCOUNTER (OUTPATIENT)
Dept: HOSPITAL 47 - FBPOP | Age: 22
Discharge: HOME | End: 2022-03-13
Attending: OBSTETRICS & GYNECOLOGY
Payer: COMMERCIAL

## 2022-03-13 VITALS
DIASTOLIC BLOOD PRESSURE: 83 MMHG | HEART RATE: 87 BPM | TEMPERATURE: 98.1 F | RESPIRATION RATE: 16 BRPM | SYSTOLIC BLOOD PRESSURE: 128 MMHG

## 2022-03-13 DIAGNOSIS — O13.3: Primary | ICD-10-CM

## 2022-03-13 DIAGNOSIS — O98.513: ICD-10-CM

## 2022-03-13 DIAGNOSIS — Z3A.35: ICD-10-CM

## 2022-03-13 PROCEDURE — 59025 FETAL NON-STRESS TEST: CPT

## 2022-03-13 PROCEDURE — 99213 OFFICE O/P EST LOW 20 MIN: CPT

## 2022-03-13 NOTE — P.MSEPDOC
Presenting Problems





- Arrival Data


Date of Arrival on Unit: 22


Time of Arrival on Unit: 00:11


Mode of Transport: Ambulatory





- Complaint


OB-Reason for Admission/Chief Complaint: Acute Nausea/Vomiting, Pain


Comment: Patient presents to triage with complaints of pelvis pressure and pain.

Patient.  also states she has been having nausea and loose stools today. Patient

says she had a headache earlier which she took a tylenol for and got relief.





Prenatal Medical History





- Pregnancy Information


: 3


Para: 0


Term: 0


: 0


Abortions: Spontaneous or Elective: 2


Number of Living Children: 0





- Gestational Age


Gestational Age by TOM (wks/days): 35 Weeks and 4 Days





- Prenatal History


Pregnancy Complications: Chronic HTN





Review of Systems





- Review of Systems


Constitutional: No problems


Breast: No problems


ENT: No problems


Cardiovascular: No problems


Respiratory: No problems


Gastrointestinal: No problems


Genitourinary: No problems


Musculoskeletal: No problems


Neurological: No problems


Skin: No problems





Vital Signs





- Temperature


Temperature: 98.1 F


Temperature Source: Temporal Artery Scan





- Pulse


  ** Right Brachial


Pulse Rate: 87


Pulse Assessment Method: Automatic Cuff





- Respirations


Respiratory Rate: 16


Oxygen Delivery Method: Room Air


O2 Sat by Pulse Oximetry: 98





- Blood Pressure


  ** Right Arm


Blood Pressure: 128/83


Blood Pressure Mean: 98


Blood Pressure Source: Automatic Cuff





Medical Screen Scoring





- Cervical Exam


Dilation (cm): 1


Effacement (%): 50


Station: -2


Membranes: Intact





- Fetal Assessment - Baby A


Baseline FHR: 135


Fetal Heart Rate - NICHD Category: Category I (Normal)





Physician Notification





- Physician Notified


Physician Notified Date: 22


Physician: [id number]


New Order Received: Yes





- Notification Comment


Comment: RN spoke with Dr. Howe. RN reported that patient was here for pelvic 

pain and.  pressure, nausea and loose stool. Patient has a history of 

gestational HTN, RN read patients blood pressures to Dr. RN reported cervical 

exam unchanged from prior exam the evening before. Dr. Howe states patient may 

have caught a stomach virus and she should return home. Patient should rest and 

increase fluid intake. Patient may take tylenol for normal pregnancy aches.





Maternal Fetal Triage Index





- Maternal Fetal Triage Index


Presenting for scheduled procedure w/no complaint: No





- Stat/Priority 1


Stat Priority 1: No





- Urgent/Priority 2


Urgent Priority 2: No





- Prompt/Priority 3


Prompt Priority 3: No





- Non-Urgent/Priority 4


Non-Urgent Priority 4: Yes


Criteria Met for Priority 4: COmmon discomforts of pregnancy ligament pain and 

nausea.





Disposition





- Disposition


OB Disposition: Discharge to home


Discharge Date: 22


Discharge Time: 01:00


I agree with the RN Medical Screening Exam: Yes


Case reviewed; plan agreed upon as documented in EMR&OBIX.: Yes


Diagnosis: VIRAL INTESTINAL INFECTION, UNSPECIFIED


Additional Diagnoses: 





Gestational hypertension

## 2022-03-13 NOTE — P.MSEPDOC
Presenting Problems





- Arrival Data


Date of Arrival on Unit: 22


Time of Arrival on Unit: 20:28


Mode of Transport: Ambulatory





- Complaint


OB-Reason for Admission/Chief Complaint: Rule Out SROM, Vaginal Bleeding, PIH


Comment: Possible ROM at 1730





Prenatal Medical History





- Pregnancy Information


: 3


Para: 0


Term: 0


: 0


Abortions: Spontaneous or Elective: 2


Number of Living Children: 0





- Gestational Age


Gestational Age by TOM (wks/days): 35 Weeks and 2 Days





- Prenatal History


Pregnancy Complications: Other


Comment: Gestational HTN





Review of Systems





- Review of Systems


Constitutional: No problems


Breast: No problems


ENT: No problems


Cardiovascular: No problems


Respiratory: No problems


Gastrointestinal: No problems


Genitourinary: No problems


Musculoskeletal: No problems


Neurological: No problems


Skin: No problems


Comment: Pt has a diagnosis of SVT





Vital Signs





- Temperature


Temperature: 97.5 F


Temperature Source: Temporal Artery Scan





- Pulse


  ** Right Pulse Oximetery


Pulse Rate: 101


Pulse Assessment Method: Pulse Oximetry





- Respirations


Respiratory Rate: 18


Oxygen Delivery Method: Room Air


O2 Sat by Pulse Oximetry: 97





- Blood Pressure


  ** Right Arm


Blood Pressure: 137/95


Blood Pressure Mean: 109


Blood Pressure Source: Automatic Cuff





Medical Screen Scoring





- Cervical Exam


Dilation (cm): 1.5


Effacement (%): 80


Station: -3


Membranes: Intact





- Uterine Contractions


Resting: Soft to palpation





- Fetal Assessment - Baby A


Baseline FHR: 145


Fetal Heart Rate - NICHD Category: Category I (Normal)


NST: Reactive





Physician Notification





- Physician Notified


Physician Notified Date: 22


Physician Notified Time: 21:15


Physician: Yuliya Howe Order Received: Yes





- Notification Comment


Comment: Pt here with complaint of increased BP of 148/89 at home, pt was 

diagnosed with GHTN and was told to take her BPs at home. Pt also has SVT. Pt 

also states that she has had some bleeding that she noticed in her underwear, 

along with leaking of fluid. Some pelvic pressure noted per patient, 

intermittent cramping and back pain. Abdomen is soft upon palpation.  RN 

reported to Dr. Asencio pt's complaints of bleeding, high BP at home,.  possible 

ROM. Pt's amnisure is negative. No bleeding noted upon vag exam, some light 

brown discharge noted (likely from her exam at the office today). Cervix is 1.5-

2/80%. One severe BP of 162/93 noted, remaining BPs are 140-150s/90s. Pt has c/o

HA and RUQ pain. FHR category 1, reactive NST. RN to order Pre-eclampsia labs 

and report back to Dr. Howe.





Maternal Fetal Triage Index





- Maternal Fetal Triage Index


Presenting for scheduled procedure w/no complaint: No





- Stat/Priority 1


Stat Priority 1: No





- Urgent/Priority 2


Urgent Priority 2: Yes


Provider Notified: Yuliya Howe


Provider Notified Time: 21:15


Criteria Met for Priority 2: SBPs > 140 & DPs >90 repeated. Pt symptomatic with 

HA and RUQ pain.





Disposition





- Disposition


OB Disposition: Physician follow up in office, Discharge to home


Discharge Date: 22


Discharge Time: 23:20


I agree with the RN Medical Screening Exam: Yes


Case reviewed; plan agreed upon as documented in EMR&OBIX.: Yes


Diagnosis: GESTATIONAL HTN W/O SIGNIFICANT PROTEINURIA, THIRD TRIMESTER

## 2022-03-14 ENCOUNTER — HOSPITAL ENCOUNTER (INPATIENT)
Dept: HOSPITAL 47 - 4FBP | Age: 22
LOS: 2 days | Discharge: HOME | End: 2022-03-16
Attending: OBSTETRICS & GYNECOLOGY | Admitting: OBSTETRICS & GYNECOLOGY
Payer: COMMERCIAL

## 2022-03-14 VITALS — RESPIRATION RATE: 16 BRPM

## 2022-03-14 DIAGNOSIS — Z3A.36: ICD-10-CM

## 2022-03-14 DIAGNOSIS — K21.9: ICD-10-CM

## 2022-03-14 DIAGNOSIS — Z87.01: ICD-10-CM

## 2022-03-14 DIAGNOSIS — Z90.49: ICD-10-CM

## 2022-03-14 DIAGNOSIS — J45.909: ICD-10-CM

## 2022-03-14 LAB
ALT SERPL-CCNC: 21 U/L (ref 4–34)
APTT BLD: 24.1 SEC (ref 22–30)
AST SERPL-CCNC: 25 U/L (ref 14–36)
BASOPHILS # BLD AUTO: 0 K/UL (ref 0–0.2)
BASOPHILS NFR BLD AUTO: 0 %
BUN SERPL-SCNC: 6 MG/DL (ref 7–17)
EOSINOPHIL # BLD AUTO: 0.2 K/UL (ref 0–0.7)
EOSINOPHIL NFR BLD AUTO: 1 %
ERYTHROCYTE [DISTWIDTH] IN BLOOD BY AUTOMATED COUNT: 3.91 M/UL (ref 3.8–5.4)
ERYTHROCYTE [DISTWIDTH] IN BLOOD: 13 % (ref 11.5–15.5)
FIBRINOGEN PPP-MCNC: 445 MG/DL (ref 200–500)
HCT VFR BLD AUTO: 38.3 % (ref 34–46)
HGB BLD-MCNC: 13.1 GM/DL (ref 11.4–16)
INR PPP: 0.8 (ref ?–1.2)
LDH SPEC-CCNC: 501 U/L (ref 313–618)
LYMPHOCYTES # SPEC AUTO: 1.9 K/UL (ref 1–4.8)
LYMPHOCYTES NFR SPEC AUTO: 15 %
MCH RBC QN AUTO: 33.5 PG (ref 25–35)
MCHC RBC AUTO-ENTMCNC: 34.2 G/DL (ref 31–37)
MCV RBC AUTO: 97.9 FL (ref 80–100)
MONOCYTES # BLD AUTO: 0.6 K/UL (ref 0–1)
MONOCYTES NFR BLD AUTO: 4 %
NEUTROPHILS # BLD AUTO: 10 K/UL (ref 1.3–7.7)
NEUTROPHILS NFR BLD AUTO: 79 %
PH UR: 6 [PH] (ref 5–8)
PLATELET # BLD AUTO: 258 K/UL (ref 150–450)
PROT/CREAT UR-RTO: 0.1
PT BLD: 9.4 SEC (ref 9–12)
SP GR UR: 1.01 (ref 1–1.03)
SQUAMOUS UR QL AUTO: 4 /HPF (ref 0–4)
URATE SERPL-MCNC: 6 MG/DL (ref 3.7–7.4)
UROBILINOGEN UR QL STRIP: <2 MG/DL (ref ?–2)
WBC # BLD AUTO: 12.7 K/UL (ref 4–11)
WBC #/AREA URNS HPF: 2 /HPF (ref 0–5)

## 2022-03-14 PROCEDURE — 82565 ASSAY OF CREATININE: CPT

## 2022-03-14 PROCEDURE — 84156 ASSAY OF PROTEIN URINE: CPT

## 2022-03-14 PROCEDURE — 85610 PROTHROMBIN TIME: CPT

## 2022-03-14 PROCEDURE — 10907ZC DRAINAGE OF AMNIOTIC FLUID, THERAPEUTIC FROM PRODUCTS OF CONCEPTION, VIA NATURAL OR ARTIFICIAL OPENING: ICD-10-PCS

## 2022-03-14 PROCEDURE — 83615 LACTATE (LD) (LDH) ENZYME: CPT

## 2022-03-14 PROCEDURE — 84460 ALANINE AMINO (ALT) (SGPT): CPT

## 2022-03-14 PROCEDURE — 85025 COMPLETE CBC W/AUTO DIFF WBC: CPT

## 2022-03-14 PROCEDURE — 0HQ9XZZ REPAIR PERINEUM SKIN, EXTERNAL APPROACH: ICD-10-PCS

## 2022-03-14 PROCEDURE — 3E033VJ INTRODUCTION OF OTHER HORMONE INTO PERIPHERAL VEIN, PERCUTANEOUS APPROACH: ICD-10-PCS

## 2022-03-14 PROCEDURE — 86900 BLOOD TYPING SEROLOGIC ABO: CPT

## 2022-03-14 PROCEDURE — 88307 TISSUE EXAM BY PATHOLOGIST: CPT

## 2022-03-14 PROCEDURE — 84550 ASSAY OF BLOOD/URIC ACID: CPT

## 2022-03-14 PROCEDURE — 85384 FIBRINOGEN ACTIVITY: CPT

## 2022-03-14 PROCEDURE — 84520 ASSAY OF UREA NITROGEN: CPT

## 2022-03-14 PROCEDURE — 86901 BLOOD TYPING SEROLOGIC RH(D): CPT

## 2022-03-14 PROCEDURE — 86850 RBC ANTIBODY SCREEN: CPT

## 2022-03-14 PROCEDURE — 4A0HXCZ MEASUREMENT OF PRODUCTS OF CONCEPTION, CARDIAC RATE, EXTERNAL APPROACH: ICD-10-PCS

## 2022-03-14 PROCEDURE — 85730 THROMBOPLASTIN TIME PARTIAL: CPT

## 2022-03-14 PROCEDURE — 84450 TRANSFERASE (AST) (SGOT): CPT

## 2022-03-14 PROCEDURE — 81001 URINALYSIS AUTO W/SCOPE: CPT

## 2022-03-14 PROCEDURE — 82570 ASSAY OF URINE CREATININE: CPT

## 2022-03-14 RX ADMIN — IBUPROFEN PRN MG: 600 TABLET ORAL at 22:00

## 2022-03-14 RX ADMIN — POTASSIUM CHLORIDE SCH MLS/HR: 14.9 INJECTION, SOLUTION INTRAVENOUS at 12:30

## 2022-03-14 RX ADMIN — DOCUSATE SODIUM AND SENNOSIDES SCH EACH: 50; 8.6 TABLET ORAL at 21:59

## 2022-03-14 RX ADMIN — POTASSIUM CHLORIDE SCH: 14.9 INJECTION, SOLUTION INTRAVENOUS at 22:27

## 2022-03-14 RX ADMIN — POTASSIUM CHLORIDE SCH MLS/HR: 14.9 INJECTION, SOLUTION INTRAVENOUS at 15:37

## 2022-03-14 NOTE — P.PROBDLV
Vaginal Delivery Note





- .


Vaginal Delivery Note: 





22-year-old  presents at 35 weeks and 5 days to my office with blood 

pressures of 150/100.  Her preeclamptic labs were normal but she is feeling 

nauseous and having swelling and headache.  Her cervix is 1 cm dilated, 80% 

effaced, and -2 station.  She is not sly.  Fetal heart tones 130 with 

moderate variability and reactive.  I diagnosed with preeclampsia and started to

move towards delivery.  Pitocin was started and amniotomy performed at 12:42 PM 

clear fluid noted.  When she was uncomfortable she got an epidural.  Her cervix 

was completely dilated left are 1830.  She pushed, delivered a viable male 

infant over intact perineum under epidural anesthesia at 1857.  Head delivered 

OA, anterior shoulder delivered gentle downward guidance for by posterior 

shoulder and rest of body.  Nose and mouth bulb suctioned, cord clamped and cut,

infant placed mother's abdomen.  Apgars 9, 9, weight 5 pounds 10.5 ounces.  

Placenta delivered spontaneously, intact with three-vessel cord.  Vagina, 

cervix, and perineum were inspected.  Right labial laceration was repaired with 

3-0 Vicryl.  Estimated blood loss 200 mL.  Mother and baby in stable condition.

## 2022-03-14 NOTE — P.HPOB
History of Present Illness


H&P Date: 22


Chief Complaint: Induction of labor for preeclampsia





22-year-old  presents at 35 weeks and 5 days to my office with blood 

pressures of 150/100.  Her preeclamptic labs were normal but she is feeling 

nauseous and having swelling and headache.  Her cervix is 1 cm dilated, 80% 

effaced, and -2 station.  She is not sly.  Fetal heart tones 130 with 

moderate variability and reactive.  I diagnosed with preeclampsia and we'll move

towards delivery.





Review of Systems


All systems: negative


Constitutional: Denies chills, Denies fever


Eyes: denies blurred vision, denies pain


Ears, nose, mouth and throat: Denies headache, Denies sore throat


Cardiovascular: Denies chest pain, Denies shortness of breath


Respiratory: Denies cough


Gastrointestinal: Denies abdominal pain, Denies diarrhea, Denies nausea, Denies 

vomiting


Genitourinary: Denies dysuria, Denies hematuria


Musculoskeletal: Denies myalgias


Integumentary: Denies pruritus, Denies rash


Neurological: Denies numbness, Denies weakness


Psychiatric: Denies anxiety, Denies depression


Endocrine: Denies fatigue, Denies weight change





Past Medical History


Past Medical History: Asthma, GERD/Reflux, Musculoskeletal Disorder


Additional Past Medical History / Comment(s): Hx Pneumonia. Degenerative Disc 

Disease,


History of Any Multi-Drug Resistant Organisms: None Reported


Past Surgical History: Adenoidectomy, Cholecystectomy, Tonsillectomy


Additional Past Surgical History / Comment(s): [place] teeth removed, ganglion 

cyst on left hand removed, D&C


Past Anesthesia/Blood Transfusion Reactions: Postoperative Nausea & Vomiting 

(PONV)


Past Psychological History: No Psychological Hx Reported


Smoking Status: Never smoker


Past Alcohol Use History: None Reported


Past Drug Use History: None Reported





- Past Family History


  ** Mother


Family Medical History: No Reported History





Medications and Allergies


                                Home Medications











 Medication  Instructions  Recorded  Confirmed  Type


 


Pnv,Calcium 72/Iron/Folic Acid 1 tab PO HS 21 History





[Prenatal Plus Tablet]    


 


Ondansetron [Zofran] 4 mg PO Q12HR PRN 22 History


 


Omeprazole Magnesium [PriLOSEC OTC] 1 tab PO DAILY 22 History








                                    Allergies











Allergy/AdvReac Type Severity Reaction Status Date / Time


 


poppyseed oil Allergy Severe Anaphylaxis Verified 22 12:21














Exam


Osteopathic Statement: *.  No significant issues noted on an osteopathic 

structural exam other than those noted in the History and Physical/Consult.


                                   Vital Signs











  Temp Pulse Resp BP


 


 22 12:21  97.7 F  99  16  171/96








                                Intake and Output











 22





 06:59 14:59 22:59


 


Intake Total   


 


Balance   


 


Intake:   


 


  IV   


 


Other:   


 


  Weight  76.204 kg 














Heart: Regular rate and rhythm


Lungs: Clear to auscultation bilaterally


Abdomen: Soft, nontender


Extremities: Negative Homans sign





Results


Result Diagrams: 


                                 22 12:27





                                 22 12:27


                  Abnormal Lab Results - Last 24 Hours (Table)











  22 Range/Units





  12:27 12:27 12:27 


 


WBC  12.7 H    (4.0-11.0)  k/uL


 


Neutrophils #  10.0 H    (1.3-7.7)  k/uL


 


BUN    6 L  (7-17)  mg/dL


 


Ur Leukocyte Esterase   Small H   (Negative)  


 


Amorphous Sediment   Rare H   (None)  /hpf


 


Urine Bacteria   Rare H   (None)  /hpf


 


Urine Mucus   Occasional H   (None)  /hpf














Assessment and Plan


(1) Preeclampsia


Current Visit: Yes   Status: Acute   Code(s): O14.90 - UNSPECIFIED PRE-

ECLAMPSIA, UNSPECIFIED TRIMESTER   SNOMED Code(s): 657202875


   





(2) 35 to 36 weeks gestation of pregnancy


Current Visit: Yes   Status: Acute   Code(s): SML2130 -    SNOMED Code(s): 

119381503


   





(3) Encounter for induction of labor


Current Visit: Yes   Status: Acute   Code(s): Z34.90 - ENCNTR FOR SUPRVSN OF 

NORMAL PREGNANCY, UNSP, UNSP TRIMESTER   SNOMED Code(s): 969695156


   


Plan: 





1.  Admit to family birth place


2.  Amniotomy and Pitocin for induction of labor


3.  Anticipate normal vaginal delivery

## 2022-03-15 LAB
BASOPHILS # BLD AUTO: 0 K/UL (ref 0–0.2)
BASOPHILS NFR BLD AUTO: 0 %
EOSINOPHIL # BLD AUTO: 0 K/UL (ref 0–0.7)
EOSINOPHIL NFR BLD AUTO: 0 %
ERYTHROCYTE [DISTWIDTH] IN BLOOD BY AUTOMATED COUNT: 3.48 M/UL (ref 3.8–5.4)
ERYTHROCYTE [DISTWIDTH] IN BLOOD: 13 % (ref 11.5–15.5)
HCT VFR BLD AUTO: 34.5 % (ref 34–46)
HGB BLD-MCNC: 11.8 GM/DL (ref 11.4–16)
LYMPHOCYTES # SPEC AUTO: 1.5 K/UL (ref 1–4.8)
LYMPHOCYTES NFR SPEC AUTO: 6 %
MCH RBC QN AUTO: 34 PG (ref 25–35)
MCHC RBC AUTO-ENTMCNC: 34.3 G/DL (ref 31–37)
MCV RBC AUTO: 99.2 FL (ref 80–100)
MONOCYTES # BLD AUTO: 0.8 K/UL (ref 0–1)
MONOCYTES NFR BLD AUTO: 3 %
NEUTROPHILS # BLD AUTO: 23.2 K/UL (ref 1.3–7.7)
NEUTROPHILS NFR BLD AUTO: 91 %
PLATELET # BLD AUTO: 259 K/UL (ref 150–450)
WBC # BLD AUTO: 25.6 K/UL (ref 4–11)

## 2022-03-15 RX ADMIN — IBUPROFEN PRN MG: 600 TABLET ORAL at 10:30

## 2022-03-15 RX ADMIN — DOCUSATE SODIUM AND SENNOSIDES SCH EACH: 50; 8.6 TABLET ORAL at 09:10

## 2022-03-15 RX ADMIN — ACETAMINOPHEN PRN MG: 325 TABLET, FILM COATED ORAL at 06:24

## 2022-03-15 RX ADMIN — ACETAMINOPHEN PRN MG: 325 TABLET, FILM COATED ORAL at 20:14

## 2022-03-15 RX ADMIN — ACETAMINOPHEN PRN MG: 325 TABLET, FILM COATED ORAL at 15:50

## 2022-03-15 RX ADMIN — ACETAMINOPHEN PRN MG: 325 TABLET, FILM COATED ORAL at 00:25

## 2022-03-15 RX ADMIN — DOCUSATE SODIUM AND SENNOSIDES SCH: 50; 8.6 TABLET ORAL at 20:13

## 2022-03-15 RX ADMIN — IBUPROFEN PRN MG: 600 TABLET ORAL at 04:31

## 2022-03-15 NOTE — P.PNOBGVD
Subjective





- Subjective


Principal diagnosis: Status post normal vaginal delivery postpartum day #1


Interval history: 





Patient seen and examined.  Denies nausea, vomiting, chest pain, shortness of 

breath or calf pain.


Patient reports: Reports appetite normal, Reports voiding normally, Reports pain

well controlled, Reports ambulating normally





Objective





- Latest Vital Signs


Latest vital signs: 


                                   Vital Signs











  Temp Pulse Resp BP Pulse Ox


 


 03/15/22 04:00  97.9 F  91  16  119/80  98


 


 03/15/22 00:00  98.1 F  82  16  137/90 


 


 03/14/22 21:10  97.9 F  93  16  138/88 


 


 03/14/22 20:40   96  16  139/91 


 


 03/14/22 20:10   77  16  138/92 


 


 03/14/22 19:55   77  16  138/92 


 


 03/14/22 19:40   74  16  133/88 


 


 03/14/22 19:25   93  16  141/90 


 


 03/14/22 19:10   104 H  18  147/91 


 


 03/14/22 12:21  97.7 F  99  16  171/96 








                                Intake and Output











 03/14/22 03/15/22 03/15/22





 22:59 06:59 14:59


 


Intake Total 2179.667  


 


Output Total 278  


 


Balance 1901.667  


 


Intake:   


 


  IV 2000  


 


  Intake, IV Titration 179.667  





  Amount   


 


    Oxytocin 30 Units/500 ml 179.667  





    Ns 30 unit In Saline 1   





    500ml.bag @ Per Protocol   





    IV .Q0M [facility] Rx#:[id number]   


 


Output:   


 


  Output, Quantitative 278  





  Blood Loss   


 


Other:   


 


  # Voids  1 














- Exam


Lungs: bilateral: normal


Chest: Normal S1, Normal S2


Extremities: Present: normal


Abdomen: Present: normal appearance, soft


Uterus: Present: normal, firm





- Labs


Labs: 


                  Abnormal Lab Results - Last 24 Hours (Table)











  03/14/22 03/14/22 03/14/22 Range/Units





  12:27 12:27 12:27 


 


WBC  12.7 H    (4.0-11.0)  k/uL


 


RBC     (3.80-5.40)  m/uL


 


Neutrophils #  10.0 H    (1.3-7.7)  k/uL


 


BUN    6 L  (7-17)  mg/dL


 


Ur Leukocyte Esterase   Small H   (Negative)  


 


Amorphous Sediment   Rare H   (None)  /hpf


 


Urine Bacteria   Rare H   (None)  /hpf


 


Urine Mucus   Occasional H   (None)  /hpf














  03/15/22 Range/Units





  06:55 


 


WBC  25.6 H  (4.0-11.0)  k/uL


 


RBC  3.48 L  (3.80-5.40)  m/uL


 


Neutrophils #  23.2 H  (1.3-7.7)  k/uL


 


BUN   (7-17)  mg/dL


 


Ur Leukocyte Esterase   (Negative)  


 


Amorphous Sediment   (None)  /hpf


 


Urine Bacteria   (None)  /hpf


 


Urine Mucus   (None)  /hpf














Assessment and Plan


(1) Preeclampsia


Current Visit: Yes   Status: Acute   Code(s): O14.90 - UNSPECIFIED PRE-ECLAMP

GEM, UNSPECIFIED TRIMESTER   SNOMED Code(s): 463519624


   





(2) 35 to 36 weeks gestation of pregnancy


Current Visit: Yes   Status: Resolved   Code(s): IRQ6913 -    SNOMED Code(s): 

923237400


   





(3) Encounter for induction of labor


Current Visit: Yes   Status: Resolved   Code(s): Z34.90 - ENCNTR FOR SUPRVSN OF 

NORMAL PREGNANCY, UNSP, UNSP TRIMESTER   SNOMED Code(s): 748779973


   





(4) Status post normal vaginal delivery


Current Visit: Yes   Status: Acute   Code(s): SEQ9745 -    SNOMED Code(s): 

252829135


   


Plan: 





1.  Continue postpartum care


2.  Monitor blood pressures

## 2022-03-16 VITALS — HEART RATE: 76 BPM | SYSTOLIC BLOOD PRESSURE: 122 MMHG | DIASTOLIC BLOOD PRESSURE: 80 MMHG | TEMPERATURE: 98 F

## 2022-03-16 RX ADMIN — IBUPROFEN PRN MG: 600 TABLET ORAL at 07:29

## 2022-03-16 RX ADMIN — DOCUSATE SODIUM AND SENNOSIDES SCH: 50; 8.6 TABLET ORAL at 09:54

## 2022-03-16 RX ADMIN — ACETAMINOPHEN PRN MG: 325 TABLET, FILM COATED ORAL at 00:17

## 2022-03-16 RX ADMIN — IBUPROFEN PRN MG: 600 TABLET ORAL at 18:38

## 2022-03-16 NOTE — P.DS
Providers


Date of admission: 


03/14/22 12:13





Expected date of discharge: 03/16/22


Attending physician: 


Maribel Asencio





Primary care physician: 


Stated None








- Discharge Diagnosis(es)


(1) Preeclampsia


Current Visit: Yes   Status: Acute   





(2) 35 to 36 weeks gestation of pregnancy


Current Visit: Yes   Status: Resolved   





(3) Encounter for induction of labor


Current Visit: Yes   Status: Resolved   





(4) Status post normal vaginal delivery


Current Visit: Yes   Status: Acute   


Hospital Course: 





Pt presented for induction of labor. She underwent a normal vaginal delivery. H

er PP course waas uncomplicated. She will be discharged home PPD #2 in stable 

condition to follow up with me in 6 weeks. s/s of pre-eclampsia discussed and if

any symptoms return, pt to be seen.





Plan - Discharge Summary


New Discharge Prescriptions: 


New


   Ibuprofen [Motrin] 600 mg PO Q6HR PRN #30 tab


     PRN Reason: Mild Pain (Scale 1 To 3)





No Action


   Pnv,Calcium 72/Iron/Folic Acid [Prenatal Plus Tablet] 1 tab PO HS


   Ondansetron [Zofran] 4 mg PO Q12HR PRN


     PRN Reason: Nausea


   Omeprazole Magnesium [PriLOSEC OTC] 1 tab PO DAILY


Discharge Medication List





Pnv,Calcium 72/Iron/Folic Acid [Prenatal Plus Tablet] 1 tab PO HS 04/18/21 

[History]


Ondansetron [Zofran] 4 mg PO Q12HR PRN 03/07/22 [History]


Omeprazole Magnesium [PriLOSEC OTC] 1 tab PO DAILY 03/11/22 [History]


Ibuprofen [Motrin] 600 mg PO Q6HR PRN #30 tab 03/16/22 [Rx]








Follow up Appointment(s)/Referral(s): 


Maribel Asencio DO [Doctor of Osteopathic Medicine] - 04/29/22 10:45 am


Discharge Disposition: HOME SELF-CARE

## 2022-03-25 NOTE — P.MSEPDOC
Presenting Problems





- Arrival Data


Date of Arrival on Unit: 03/10/22


Time of Arrival on Unit: 23:41


Mode of Transport: Ambulatory





- Complaint


OB-Reason for Admission/Chief Complaint: Pain


Comment: Patient presents to triage with c/o cramping, pelvic and back pain that

have been occurring all day.





Prenatal Medical History





- Pregnancy Information


: 3


Para: 0


Term: 0


: 0


Abortions: Spontaneous or Elective: 2


Number of Living Children: 0





- Gestational Age


Gestational Age by TOM (wks/days): 35 Weeks and 1 Days





Review of Systems





- Review of Systems


Constitutional: No problems


Breast: No problems


ENT: No problems


Cardiovascular: No problems


Respiratory: No problems


Gastrointestinal: No problems


Genitourinary: No problems


Musculoskeletal: No problems


Neurological: No problems


Skin: No problems





Vital Signs





- Temperature


Temperature: 97.5 F


Temperature Source: Temporal Artery Scan





- Pulse


  ** Pulse Oximetery


Pulse Rate: 81


Pulse Assessment Method: Automatic Cuff





- Respirations


Respiratory Rate: 17


Oxygen Delivery Method: Room Air





- Blood Pressure


  ** Right Arm


Blood Pressure: 140/93


Blood Pressure Mean: 108


Blood Pressure Source: Automatic Cuff





Medical Screen Scoring





- Uterine Contractions


Intensity: Absent


Resting: Soft to palpation





- Fetal Assessment - Baby A


Baseline FHR: 140


Fetal Heart Rate - NICHD Category: Category I (Normal)


NST: Reactive





Physician Notification





- Physician Notified


Physician Notified Date: 03/10/22


Physician Notified Time: 00:21


Physician: Maribel Asencio


New Order Received: Yes





- Notification Comment


Comment: RN spoke with Dr. Asecnio regarding patient c/o cramping, pelvic and 

back pain.  that has been occurring all day, elevated BPs, no contx, darlene 1 FHT,

reactive NST, and cervical exam. Per Dr. Asencio RN to do PIH workup. Reported 

on labs and vital signs given to Dr Asencio, orders to d/c pt home and for 

patient to keep her appt to see her in the office on Friday





Maternal Fetal Triage Index





- Maternal Fetal Triage Index


Presenting for scheduled procedure w/no complaint: No





- Stat/Priority 1


Stat Priority 1: No





- Urgent/Priority 2


Urgent Priority 2: No





- Prompt/Priority 3


Prompt Priority 3: No





- Non-Urgent/Priority 4


Non-Urgent Priority 4: Yes


Criteria Met for Priority 4: Common discomforts of pregnancy - cramping, pelvic 

and back pain





Disposition





- Disposition


OB Disposition: Discharge to home, Written follow up instructions reviewed


Discharge Date: 03/10/22


Discharge Time: 01:30


I agree with the RN Medical Screening Exam: Yes


Case reviewed; plan agreed upon as documented in EMR&OBIX.: Yes


Diagnosis: FALSE LABOR BEFORE 37 COMPLETED WEEKS OF GEST, THIRD TRI

## 2022-03-30 ENCOUNTER — HOSPITAL ENCOUNTER (OUTPATIENT)
Dept: HOSPITAL 47 - LABWHC1 | Age: 22
Discharge: HOME | End: 2022-03-30
Attending: INTERNAL MEDICINE
Payer: COMMERCIAL

## 2022-03-30 DIAGNOSIS — D64.9: Primary | ICD-10-CM

## 2022-03-30 LAB
ERYTHROCYTE [DISTWIDTH] IN BLOOD BY AUTOMATED COUNT: 3.98 X 10*6/UL (ref 4.1–5.2)
ERYTHROCYTE [DISTWIDTH] IN BLOOD: 12.4 % (ref 11.5–14.5)
HCT VFR BLD AUTO: 39.7 % (ref 37.2–46.3)
HGB BLD-MCNC: 12.8 G/DL (ref 12–15)
MCH RBC QN AUTO: 32.2 PG (ref 27–32)
MCHC RBC AUTO-ENTMCNC: 32.2 G/DL (ref 32–37)
MCV RBC AUTO: 99.7 FL (ref 80–97)
NRBC BLD AUTO-RTO: 0 /100 WBCS (ref 0–0)
PLATELET # BLD AUTO: 317 X 10*3/UL (ref 140–440)
WBC # BLD AUTO: 5.2 X 10*3/UL (ref 4.5–10)

## 2022-03-30 PROCEDURE — 36415 COLL VENOUS BLD VENIPUNCTURE: CPT

## 2022-03-30 PROCEDURE — 85027 COMPLETE CBC AUTOMATED: CPT

## 2022-05-23 ENCOUNTER — HOSPITAL ENCOUNTER (EMERGENCY)
Dept: HOSPITAL 47 - EC | Age: 22
Discharge: HOME | End: 2022-05-23
Payer: COMMERCIAL

## 2022-05-23 VITALS
HEART RATE: 97 BPM | TEMPERATURE: 97.8 F | DIASTOLIC BLOOD PRESSURE: 95 MMHG | RESPIRATION RATE: 18 BRPM | SYSTOLIC BLOOD PRESSURE: 143 MMHG

## 2022-05-23 DIAGNOSIS — Z79.899: ICD-10-CM

## 2022-05-23 DIAGNOSIS — K21.9: ICD-10-CM

## 2022-05-23 DIAGNOSIS — J45.909: ICD-10-CM

## 2022-05-23 DIAGNOSIS — N39.0: Primary | ICD-10-CM

## 2022-05-23 LAB
PH UR: 6.5 [PH] (ref 5–8)
RBC UR QL: 11 /HPF (ref 0–5)
SP GR UR: 1 (ref 1–1.03)
UROBILINOGEN UR QL STRIP: <2 MG/DL (ref ?–2)
WBC #/AREA URNS HPF: 30 /HPF (ref 0–5)

## 2022-05-23 PROCEDURE — 81001 URINALYSIS AUTO W/SCOPE: CPT

## 2022-05-23 PROCEDURE — 99283 EMERGENCY DEPT VISIT LOW MDM: CPT

## 2022-05-23 PROCEDURE — 81025 URINE PREGNANCY TEST: CPT

## 2022-05-23 NOTE — ED
Female Urogenital HPI





- General


Chief complaint: Urogenital


Stated complaint: Vaginal bleeding, abdominal pain


Source: patient, RN notes reviewed, old records reviewed


Mode of arrival: ambulatory


Limitations: no limitations





- History of Present Illness


Initial comments: 





This is a 22-year-old female to the emergency department for evaluation.  

Patient comes in with burning with urination frequency of urination persistent 

urination and pain.  Burning pain.  Patient denies possibility of pregnancy, 

states that she does and has been on her period.  Patient has no travel show si

ck contacts no fevers.  Mild nausea no vomiting or diarrhea.


MD Complaint: dysuria


-: hour(s)


Location: suprapubic


Severity: mild


Severity scale (1-10): 3


Quality: sharp, burning


Consistency: constant


Improves with: none


Worsens with: urination


Patient Pregnant: No


Associated Symptoms: dysuria





- Related Data


                                Home Medications











 Medication  Instructions  Recorded  Confirmed


 


Pnv,Calcium 72/Iron/Folic Acid 1 tab PO HS 04/18/21 03/14/22





[Prenatal Plus Tablet]   


 


Ondansetron [Zofran] 4 mg PO Q12HR PRN 03/07/22 03/14/22


 


Omeprazole Magnesium [PriLOSEC OTC] 1 tab PO DAILY 03/11/22 03/14/22








                                  Previous Rx's











 Medication  Instructions  Recorded


 


Ibuprofen [Motrin] 600 mg PO Q6HR PRN #30 tab 03/16/22











                                    Allergies











Allergy/AdvReac Type Severity Reaction Status Date / Time


 


poppyseed oil Allergy Severe Anaphylaxis Verified 05/23/22 06:08














Review of Systems


ROS Statement: 


Those systems with pertinent positive or pertinent negative responses have been 

documented in the HPI.





ROS Other: All systems not noted in ROS Statement are negative.





Past Medical History


Past Medical History: Asthma, GERD/Reflux, Musculoskeletal Disorder


Additional Past Medical History / Comment(s): Hx Pneumonia. Degenerative Disc 

Disease,


History of Any Multi-Drug Resistant Organisms: None Reported


Past Surgical History: Adenoidectomy, Cholecystectomy, Tonsillectomy


Additional Past Surgical History / Comment(s): [place] teeth removed, ganglion cy

st on left hand removed, D&C


Past Anesthesia/Blood Transfusion Reactions: Postoperative Nausea & Vomiting 

(PONV)


Past Psychological History: No Psychological Hx Reported


Smoking Status: Never smoker


Past Alcohol Use History: None Reported


Past Drug Use History: None Reported





- Past Family History


  ** Mother


Family Medical History: No Reported History





General Exam


Limitations: no limitations


General appearance: alert, in no apparent distress


Head exam: Present: atraumatic, normocephalic, normal inspection


Eye exam: Present: normal appearance, PERRL, EOMI.  Absent: scleral icterus, 

conjunctival injection, periorbital swelling


ENT exam: Present: normal exam, mucous membranes moist


Neck exam: Present: normal inspection.  Absent: tenderness, meningismus, 

lymphadenopathy


Respiratory exam: Present: normal lung sounds bilaterally.  Absent: respiratory 

distress, wheezes, rales, rhonchi, stridor


Cardiovascular Exam: Present: regular rate, normal rhythm, normal heart sounds. 

 Absent: systolic murmur, diastolic murmur, rubs, gallop, clicks


GI/Abdominal exam: Present: soft, normal bowel sounds.  Absent: distended, 

tenderness, guarding, rebound, rigid


Extremities exam: Present: normal inspection, full ROM, normal capillary refill.

  Absent: tenderness, pedal edema, joint swelling, calf tenderness


Back exam: Present: normal inspection


Neurological exam: Present: alert, oriented X3, CN II-XII intact


Psychiatric exam: Present: normal affect, normal mood


Skin exam: Present: warm, dry, intact, normal color.  Absent: rash





Course





                                   Vital Signs











  05/23/22





  06:05


 


Temperature 97.8 F


 


Pulse Rate 97


 


Respiratory 18





Rate 


 


Blood Pressure 143/95


 


O2 Sat by Pulse 98





Oximetry 














- Reevaluation(s)


Reevaluation #1: 





05/23/22 06:27


Medical record is reviewed


Reevaluation #2: 





05/23/22 06:27


Patient informed results here in the ER questions answered


Reevaluation #3: 





05/23/22 06:27


Patient feels improved for discharge





Medical Decision Making





- Medical Decision Making





22 femaleto the ER for evaluation positive for urinary tract infection.  Patient

 given antibiotics and symptom care.  Patient can be discharged home





Disposition


Clinical Impression: 


 Urinary tract infection





Disposition: HOME SELF-CARE


Condition: Good


Instructions (If sedation given, give patient instructions):  Urinary Tract 

Infection in Women (ED)


Is patient prescribed a controlled substance at d/c from ED?: No


Referrals: 


Damian Roldan DO [Primary Care Provider] - 1-2 days

## 2022-12-18 ENCOUNTER — HOSPITAL ENCOUNTER (EMERGENCY)
Dept: HOSPITAL 47 - EC | Age: 22
Discharge: HOME | End: 2022-12-18
Payer: COMMERCIAL

## 2022-12-18 VITALS — DIASTOLIC BLOOD PRESSURE: 78 MMHG | SYSTOLIC BLOOD PRESSURE: 120 MMHG | TEMPERATURE: 97.9 F | HEART RATE: 73 BPM

## 2022-12-18 VITALS — RESPIRATION RATE: 16 BRPM

## 2022-12-18 DIAGNOSIS — Z91.018: ICD-10-CM

## 2022-12-18 DIAGNOSIS — J45.909: ICD-10-CM

## 2022-12-18 DIAGNOSIS — Z33.1: Primary | ICD-10-CM

## 2022-12-18 DIAGNOSIS — K21.9: ICD-10-CM

## 2022-12-18 DIAGNOSIS — Z79.899: ICD-10-CM

## 2022-12-18 DIAGNOSIS — Z90.49: ICD-10-CM

## 2022-12-18 LAB
ALBUMIN SERPL-MCNC: 4.5 G/DL (ref 3.5–5)
ALP SERPL-CCNC: 85 U/L (ref 38–126)
ALT SERPL-CCNC: 22 U/L (ref 4–34)
AMYLASE SERPL-CCNC: 63 U/L (ref 30–110)
ANION GAP SERPL CALC-SCNC: 9 MMOL/L
APTT BLD: 27.7 SEC (ref 22–30)
AST SERPL-CCNC: 23 U/L (ref 14–36)
BASOPHILS # BLD AUTO: 0 K/UL (ref 0–0.2)
BASOPHILS NFR BLD AUTO: 0 %
BUN SERPL-SCNC: 11 MG/DL (ref 7–17)
CALCIUM SPEC-MCNC: 8.7 MG/DL (ref 8.4–10.2)
CHLORIDE SERPL-SCNC: 103 MMOL/L (ref 98–107)
CO2 SERPL-SCNC: 24 MMOL/L (ref 22–30)
EOSINOPHIL # BLD AUTO: 0.2 K/UL (ref 0–0.7)
EOSINOPHIL NFR BLD AUTO: 2 %
ERYTHROCYTE [DISTWIDTH] IN BLOOD BY AUTOMATED COUNT: 4.21 M/UL (ref 3.8–5.4)
ERYTHROCYTE [DISTWIDTH] IN BLOOD: 12.4 % (ref 11.5–15.5)
GLUCOSE SERPL-MCNC: 89 MG/DL (ref 74–99)
HCG SERPL-MCNC: 27.5 MIU/ML
HCT VFR BLD AUTO: 38.6 % (ref 34–46)
HGB BLD-MCNC: 13.6 GM/DL (ref 11.4–16)
INR PPP: 1 (ref ?–1.2)
LIPASE SERPL-CCNC: 81 U/L (ref 23–300)
LYMPHOCYTES # SPEC AUTO: 2.2 K/UL (ref 1–4.8)
LYMPHOCYTES NFR SPEC AUTO: 21 %
MCH RBC QN AUTO: 32.4 PG (ref 25–35)
MCHC RBC AUTO-ENTMCNC: 35.3 G/DL (ref 31–37)
MCV RBC AUTO: 91.8 FL (ref 80–100)
MONOCYTES # BLD AUTO: 0.4 K/UL (ref 0–1)
MONOCYTES NFR BLD AUTO: 4 %
NEUTROPHILS # BLD AUTO: 7.5 K/UL (ref 1.3–7.7)
NEUTROPHILS NFR BLD AUTO: 71 %
PH UR: 6.5 [PH] (ref 5–8)
PLATELET # BLD AUTO: 319 K/UL (ref 150–450)
POTASSIUM SERPL-SCNC: 3.9 MMOL/L (ref 3.5–5.1)
PROT SERPL-MCNC: 7.3 G/DL (ref 6.3–8.2)
PT BLD: 10.7 SEC (ref 9–12)
RBC UR QL: <1 /HPF (ref 0–5)
SODIUM SERPL-SCNC: 136 MMOL/L (ref 137–145)
SP GR UR: 1.01 (ref 1–1.03)
SQUAMOUS UR QL AUTO: 4 /HPF (ref 0–4)
UROBILINOGEN UR QL STRIP: <2 MG/DL (ref ?–2)
WBC # BLD AUTO: 10.6 K/UL (ref 4–11)
WBC # UR AUTO: 1 /HPF (ref 0–5)

## 2022-12-18 PROCEDURE — 36415 COLL VENOUS BLD VENIPUNCTURE: CPT

## 2022-12-18 PROCEDURE — 86901 BLOOD TYPING SEROLOGIC RH(D): CPT

## 2022-12-18 PROCEDURE — 83690 ASSAY OF LIPASE: CPT

## 2022-12-18 PROCEDURE — 99284 EMERGENCY DEPT VISIT MOD MDM: CPT

## 2022-12-18 PROCEDURE — 85610 PROTHROMBIN TIME: CPT

## 2022-12-18 PROCEDURE — 76801 OB US < 14 WKS SINGLE FETUS: CPT

## 2022-12-18 PROCEDURE — 76817 TRANSVAGINAL US OBSTETRIC: CPT

## 2022-12-18 PROCEDURE — 85025 COMPLETE CBC W/AUTO DIFF WBC: CPT

## 2022-12-18 PROCEDURE — 82150 ASSAY OF AMYLASE: CPT

## 2022-12-18 PROCEDURE — 86900 BLOOD TYPING SEROLOGIC ABO: CPT

## 2022-12-18 PROCEDURE — 85730 THROMBOPLASTIN TIME PARTIAL: CPT

## 2022-12-18 PROCEDURE — 84702 CHORIONIC GONADOTROPIN TEST: CPT

## 2022-12-18 PROCEDURE — 80053 COMPREHEN METABOLIC PANEL: CPT

## 2022-12-18 PROCEDURE — 81001 URINALYSIS AUTO W/SCOPE: CPT

## 2022-12-18 PROCEDURE — 86850 RBC ANTIBODY SCREEN: CPT

## 2022-12-18 NOTE — US
EXAMINATION TYPE: Transabdominal

 

DATE OF EXAM: 2022 3:44 PM

 

COMPARISON: NONE

 

CLINICAL HISTORY: abd cramping/pain.

 

EXAM PERFORMED:  Transvaginal (TV) and Transabdominal (TA)

 

EXAM MEASUREMENTS:

 

GESTATIONAL AGE / DATING

 

Physician Established: Not yet established 

Dates by LMP: (3 weeks/5 days)  

** EDC: 22

Dates by First Scan:  No previous this is first scan 

Dates by Current Scan for: Unable to date by today's study 

 

MATERNAL ANATOMY 

 

Uterus: 8.3 x 4.0 x 4.7cm

Right Ovary: 3.1 x 2.0 x 2.1cm

Left Ovary: obscured by overlying bowel gas

Post CDS / Adnexa: mild free fluid

Presence of free fluid: very small amount of free fluid in bilateral adnexa and cul de sac

 

GESTATION / FETAL SURVEY

No IUP seen at this time. That would correspond with patient dates. 

 

Date of LMP: 22

Beta HcG (if available):  27.5

 

IMPRESSION:

No evidence of intrauterine gestational sac in this patient with a positive B-hCG. This can be seen i
n early pregnancy, ectopic pregnancy and spontaneous . Follow up pelvic ultrasound in 7-14 da
ys and serial beta hCG studies are recommended.

## 2022-12-18 NOTE — ED
General Adult HPI





- General


Chief complaint: Abdominal Pain


Stated complaint: Wants preg. test


Time Seen by Provider: 12/18/22 14:27


Source: patient, RN notes reviewed, old records reviewed


Mode of arrival: ambulatory


Limitations: no limitations





- History of Present Illness


Initial comments: 





Patient is a 22-year-old female with past medical history remarkable for asthma,

acid reflux who presents to the department over concern for lower abdominal 

pain, as well as positive pregnancy test at home.Patient took a pregnancy test 

yesterday and it was positive.  She isincreased abdominal cramping over this 

period of time.  States she does have a history of preeclampsia during 

pregnancy.  Denies any vaginal bleeding or discharge.  Denies any urinary comp

laints including dysuria or hematuria.  States the pain as a crampy sensation 

with intermittent diarrhea which is typical for her.   Been having the pain 

since then.  He has not followed up with any physician.  Denies any chest pain, 

shortness breath, abdominal pain, nausea, vomiting.  Last menstrual period was 

in late November.  However she is uncertain regarding these as she is very 

irregular since the birth of her son, who is currently 9 months.  She is not on 

birth control.  Does not use an IUD.  Has no other acute complaint at this time.

 Presents for further evaluation at this time.





- Related Data


                                Home Medications











 Medication  Instructions  Recorded  Confirmed


 


Prenatal Vit No.180/Iron/Folic 1 tab PO HS 04/18/21 03/14/22





[Prenatal Plus Tablet]   


 


Ondansetron [Zofran] 4 mg PO Q12HR PRN 03/07/22 03/14/22


 


Omeprazole Magnesium [PriLOSEC OTC] 1 tab PO DAILY 03/11/22 03/14/22








                                  Previous Rx's











 Medication  Instructions  Recorded


 


Ibuprofen [Motrin] 600 mg PO Q6HR PRN #30 tab 03/16/22


 


Phenazopyridine [Pyridium] 200 mg PO TID #6 tablet 05/23/22


 


Sulfamethox-Tmp 800-160Mg [Bactrim 1 tab PO Q12HR #14 tab 05/23/22





-160 mg]  


 


Prenatal Vit No.179/Iron/Folic 1 each PO DAILY 30 Days #30 tab 12/18/22





[Prenatal Tablet]  











                                    Allergies











Allergy/AdvReac Type Severity Reaction Status Date / Time


 


poppyseed oil Allergy Severe Anaphylaxis Verified 12/18/22 14:02














Review of Systems


ROS Statement: 


Those systems with pertinent positive or pertinent negative responses have been 

documented in the HPI.


Review of Systems:


CONST: Denies fever 


EYES: Denies blurry vision 


ENT: Denies nasal congestion  


C/V:  Denies Chest pain


RESP: Denies shortness of breath 


GI: Endorses abdominal cramping


: Denies dysuria  


SKIN: Denies rash.


MSK: Denies joint pain.


NEURO: Denies headache 


ROS Other: All systems not noted in ROS Statement are negative.





Past Medical History


Past Medical History: Asthma, GERD/Reflux, Musculoskeletal Disorder


Additional Past Medical History / Comment(s): Hx Pneumonia. Degenerative Disc 

Disease,


History of Any Multi-Drug Resistant Organisms: None Reported


Past Surgical History: Adenoidectomy, Cholecystectomy, Tonsillectomy


Additional Past Surgical History / Comment(s): [place] teeth removed, ganglion 

cyst on left hand removed, D&C


Past Anesthesia/Blood Transfusion Reactions: Postoperative Nausea & Vomiting 

(PONV)


Past Psychological History: No Psychological Hx Reported


Smoking Status: Never smoker


Past Alcohol Use History: None Reported


Past Drug Use History: None Reported





- Past Family History


  ** Mother


Family Medical History: No Reported History





General Exam





- General Exam Comments


Initial Comments: 





General: Appears in no acute distress.


HEAD:  Normal with no signs of head trauma.


EYES: EOMI.


ENT:  Hearing grossly intact, normal oropharynx.


RESPIRATORY:  Clear breath sounds bilaterally.  No wheezes, rales, or rhonchi.  


C/V:  Regular rate and rhythm. S1 and S2 auscultated, no edema, peripheral 

pulses 2+ and intact throughout


ABD: Abdomen is soft, nondistended.  Mildly tender to palpation in the 

suprapubic region.


EXT: Normal range of motion, no obvious deformity


SKIN:  No rashes or lesions observed on exposed skin.


NEURO: Alert and oriented 4.


Limitations: no limitations





Course


                                   Vital Signs











  12/18/22





  14:00


 


Temperature 98 F


 


Pulse Rate 78


 


Respiratory 16





Rate 


 


Blood Pressure 123/68


 


O2 Sat by Pulse 100





Oximetry 














Medical Decision Making





- Medical Decision Making





Based on the patient's presentation and physical exam, I'm concerned for 

possible pregnancy.  Patient.  She is concerned regarding ectopic pregnancy.  

She has no history of ectopic pregnancies, is not currently on any birth control

or IUD.  Last menstrual period was approximately a month ago.  Has been having 

crampy abdominal pain with positive home pregnancy test.  Presents for further 

evaluation due to the crampy abdominal pain.  The patient Tylenol which she 

refuses.  We will obtain basic abdominal laboratory studies as well as a 

quantitative hCG and type and screen.  We also obtain a pelvic prenatal 

ultrasound.  She was in agreement with this plan.  Vital signs are within 

acceptable limits.





Patient's laboratory studies are remarkable for a normal hemoglobin.  Beta hCG 

is slightly elevated to 27.  Urinalysis is unremarkable.  OB ultrasound revealed

no evidence of definitive IUP.





I did discuss with the patient that as her beta hCG is elevated, but no 

definitive evidence of IUP, the patient may be early on in pregnancy.  She needs

to obtain repeat laboratory studies to trend the beta hCG, repeat labs in 7-10 

days  She was in agreement with this plan.  I answered all questions that she 

had.  She follows up with Dr. Asencio outpatient.





I will provide the patient with a prescription for prenatal vitamins. I 

instructed the patient to follow up with their PCP in the next 1-3 days. I 

provided contact information for follow up with Luis M.  I explained that the 

patient should return to the emergency department if they experience any 

worsening symptoms. Strict return precautions were discussed with the patient. 

The patient expressed understanding of these instructions. I answered all 

questions that the patient had. The patient was discharged home in good 

condition with their prescriptions and follow up information.





- Lab Data


Result diagrams: 


                                 12/18/22 15:10





                                 12/18/22 15:10


                                   Lab Results











  12/18/22 12/18/22 12/18/22 Range/Units





  14:48 15:10 15:10 


 


WBC   10.6   (4.0-11.0)  k/uL


 


RBC   4.21   (3.80-5.40)  m/uL


 


Hgb   13.6   (11.4-16.0)  gm/dL


 


Hct   38.6   (34.0-46.0)  %


 


MCV   91.8   (80.0-100.0)  fL


 


MCH   32.4   (25.0-35.0)  pg


 


MCHC   35.3   (31.0-37.0)  g/dL


 


RDW   12.4   (11.5-15.5)  %


 


Plt Count   319   (150-450)  k/uL


 


MPV   7.3   


 


Neutrophils %   71   %


 


Lymphocytes %   21   %


 


Monocytes %   4   %


 


Eosinophils %   2   %


 


Basophils %   0   %


 


Neutrophils #   7.5   (1.3-7.7)  k/uL


 


Lymphocytes #   2.2   (1.0-4.8)  k/uL


 


Monocytes #   0.4   (0-1.0)  k/uL


 


Eosinophils #   0.2   (0-0.7)  k/uL


 


Basophils #   0.0   (0-0.2)  k/uL


 


PT    10.7  (9.0-12.0)  sec


 


INR    1.0  (<1.2)  


 


APTT    27.7  (22.0-30.0)  sec


 


Sodium     (137-145)  mmol/L


 


Potassium     (3.5-5.1)  mmol/L


 


Chloride     ()  mmol/L


 


Carbon Dioxide     (22-30)  mmol/L


 


Anion Gap     mmol/L


 


BUN     (7-17)  mg/dL


 


Creatinine     (0.52-1.04)  mg/dL


 


Est GFR (CKD-EPI)AfAm     (>60 ml/min/1.73 sqM)  


 


Est GFR (CKD-EPI)NonAf     (>60 ml/min/1.73 sqM)  


 


Glucose     (74-99)  mg/dL


 


Calcium     (8.4-10.2)  mg/dL


 


Total Bilirubin     (0.2-1.3)  mg/dL


 


AST     (14-36)  U/L


 


ALT     (4-34)  U/L


 


Alkaline Phosphatase     ()  U/L


 


Total Protein     (6.3-8.2)  g/dL


 


Albumin     (3.5-5.0)  g/dL


 


Amylase     ()  U/L


 


Lipase     ()  U/L


 


HCG, Quant     mIU/mL


 


Urine Color  Colorless    


 


Urine Appearance  Clear    (Clear)  


 


Urine pH  6.5    (5.0-8.0)  


 


Ur Specific Gravity  1.009    (1.001-1.035)  


 


Urine Protein  Negative    (Negative)  


 


Urine Glucose (UA)  Negative    (Negative)  


 


Urine Ketones  Negative    (Negative)  


 


Urine Blood  Negative    (Negative)  


 


Urine Nitrite  Negative    (Negative)  


 


Urine Bilirubin  Negative    (Negative)  


 


Urine Urobilinogen  <2.0    (<2.0)  mg/dL


 


Ur Leukocyte Esterase  Small H    (Negative)  


 


Urine RBC  <1    (0-5)  /hpf


 


Urine WBC  1    (0-5)  /hpf


 


Ur Squamous Epith Cells  4    (0-4)  /hpf


 


Urine Mucus  Rare H    (None)  /hpf














  12/18/22 Range/Units





  15:10 


 


WBC   (4.0-11.0)  k/uL


 


RBC   (3.80-5.40)  m/uL


 


Hgb   (11.4-16.0)  gm/dL


 


Hct   (34.0-46.0)  %


 


MCV   (80.0-100.0)  fL


 


MCH   (25.0-35.0)  pg


 


MCHC   (31.0-37.0)  g/dL


 


RDW   (11.5-15.5)  %


 


Plt Count   (150-450)  k/uL


 


MPV   


 


Neutrophils %   %


 


Lymphocytes %   %


 


Monocytes %   %


 


Eosinophils %   %


 


Basophils %   %


 


Neutrophils #   (1.3-7.7)  k/uL


 


Lymphocytes #   (1.0-4.8)  k/uL


 


Monocytes #   (0-1.0)  k/uL


 


Eosinophils #   (0-0.7)  k/uL


 


Basophils #   (0-0.2)  k/uL


 


PT   (9.0-12.0)  sec


 


INR   (<1.2)  


 


APTT   (22.0-30.0)  sec


 


Sodium  136 L  (137-145)  mmol/L


 


Potassium  3.9  (3.5-5.1)  mmol/L


 


Chloride  103  ()  mmol/L


 


Carbon Dioxide  24  (22-30)  mmol/L


 


Anion Gap  9  mmol/L


 


BUN  11  (7-17)  mg/dL


 


Creatinine  0.71  (0.52-1.04)  mg/dL


 


Est GFR (CKD-EPI)AfAm  >90  (>60 ml/min/1.73 sqM)  


 


Est GFR (CKD-EPI)NonAf  >90  (>60 ml/min/1.73 sqM)  


 


Glucose  89  (74-99)  mg/dL


 


Calcium  8.7  (8.4-10.2)  mg/dL


 


Total Bilirubin  0.4  (0.2-1.3)  mg/dL


 


AST  23  (14-36)  U/L


 


ALT  22  (4-34)  U/L


 


Alkaline Phosphatase  85  ()  U/L


 


Total Protein  7.3  (6.3-8.2)  g/dL


 


Albumin  4.5  (3.5-5.0)  g/dL


 


Amylase  63  ()  U/L


 


Lipase  81  ()  U/L


 


HCG, Quant  27.5  mIU/mL


 


Urine Color   


 


Urine Appearance   (Clear)  


 


Urine pH   (5.0-8.0)  


 


Ur Specific Gravity   (1.001-1.035)  


 


Urine Protein   (Negative)  


 


Urine Glucose (UA)   (Negative)  


 


Urine Ketones   (Negative)  


 


Urine Blood   (Negative)  


 


Urine Nitrite   (Negative)  


 


Urine Bilirubin   (Negative)  


 


Urine Urobilinogen   (<2.0)  mg/dL


 


Ur Leukocyte Esterase   (Negative)  


 


Urine RBC   (0-5)  /hpf


 


Urine WBC   (0-5)  /hpf


 


Ur Squamous Epith Cells   (0-4)  /hpf


 


Urine Mucus   (None)  /hpf














Disposition


Clinical Impression: 


 Pregnant





Disposition: HOME SELF-CARE


Condition: Good


Instructions (If sedation given, give patient instructions):  Pregnancy (ED)


Additional Instructions: 


Obtain follow up beta HCG in 7-10 days.


Prescriptions: 


Prenatal Vit No.179/Iron/Folic [Prenatal Tablet] 1 each PO DAILY 30 Days #30 tab


Is patient prescribed a controlled substance at d/c from ED?: No


Referrals: 


Damian Roldan DO [Primary Care Provider] - 1-2 days


Maribel Asencio DO [Doctor of Osteopathic Medicine] - 1-2 days


Time of Disposition: 16:15

## 2022-12-20 ENCOUNTER — HOSPITAL ENCOUNTER (OUTPATIENT)
Dept: HOSPITAL 47 - LABWHC1 | Age: 22
Discharge: HOME | End: 2022-12-20
Attending: PHYSICIAN ASSISTANT
Payer: COMMERCIAL

## 2022-12-20 DIAGNOSIS — R10.30: ICD-10-CM

## 2022-12-20 DIAGNOSIS — N94.6: Primary | ICD-10-CM

## 2022-12-20 DIAGNOSIS — R53.83: ICD-10-CM

## 2022-12-20 PROCEDURE — 36415 COLL VENOUS BLD VENIPUNCTURE: CPT

## 2022-12-20 PROCEDURE — 84702 CHORIONIC GONADOTROPIN TEST: CPT

## 2022-12-22 ENCOUNTER — HOSPITAL ENCOUNTER (OUTPATIENT)
Dept: HOSPITAL 47 - LABWHC1 | Age: 22
Discharge: HOME | End: 2022-12-22
Attending: OBSTETRICS & GYNECOLOGY
Payer: COMMERCIAL

## 2022-12-22 DIAGNOSIS — N92.6: Primary | ICD-10-CM

## 2022-12-22 PROCEDURE — 84702 CHORIONIC GONADOTROPIN TEST: CPT

## 2022-12-22 PROCEDURE — 36415 COLL VENOUS BLD VENIPUNCTURE: CPT

## 2022-12-28 ENCOUNTER — HOSPITAL ENCOUNTER (OUTPATIENT)
Dept: HOSPITAL 47 - RADUSWWP | Age: 22
Discharge: HOME | End: 2022-12-28
Attending: FAMILY MEDICINE
Payer: COMMERCIAL

## 2022-12-28 ENCOUNTER — HOSPITAL ENCOUNTER (OUTPATIENT)
Dept: HOSPITAL 47 - LABWHC1 | Age: 22
Discharge: HOME | End: 2022-12-28
Attending: PHYSICIAN ASSISTANT
Payer: COMMERCIAL

## 2022-12-28 DIAGNOSIS — R10.30: ICD-10-CM

## 2022-12-28 DIAGNOSIS — O26.891: Primary | ICD-10-CM

## 2022-12-28 DIAGNOSIS — R53.83: ICD-10-CM

## 2022-12-28 DIAGNOSIS — Z3A.00: ICD-10-CM

## 2022-12-28 DIAGNOSIS — N94.6: ICD-10-CM

## 2022-12-28 DIAGNOSIS — N94.6: Primary | ICD-10-CM

## 2022-12-28 PROCEDURE — 84702 CHORIONIC GONADOTROPIN TEST: CPT

## 2022-12-28 PROCEDURE — 76801 OB US < 14 WKS SINGLE FETUS: CPT

## 2022-12-28 PROCEDURE — 36415 COLL VENOUS BLD VENIPUNCTURE: CPT

## 2022-12-28 PROCEDURE — 76817 TRANSVAGINAL US OBSTETRIC: CPT

## 2022-12-28 NOTE — US
EXAMINATION TYPE: Transabdominal

 

DATE OF EXAM: 12/28/2022 10:52 AM

 

COMPARISON: US

 

CLINICAL HISTORY: N94.6 dysmenorrhea, Z33.1 pregnant state. Light cramping per patient.

 

EXAM PERFORMED:  Transvaginal (TV) and Transabdominal (TA)

 

EXAM MEASUREMENTS:

 

GESTATIONAL AGE / DATING

 

Physician Established: Not yet established 

Dates by LMP: (5 weeks/1 days)  

** EDC: 08/29/2023

Dates by First Scan: (5 weeks/1 day)  

** EDC: 08/29/2023

Dates by Current Scan for: (5 weeks/2 days)  

** EDC: 08/28/2023

 

MATERNAL ANATOMY 

 

Uterus: 8.7 x 6.7 x 4.5 cm.

Right Ovary: 3.0 x 2.3 x 2.1 cm. **Area of mixed echogenicity with vascularity seen in right ovary: 1
.9 x 2.0 x 2.1 cm.  

Left Ovary: 2.9 x 2.2 x 2.2 cm. Not seen TV.

Post CDS / Adnexa: Prominent vessels seen within right adnexa.

Presence of free fluid: No

Presence of corpus luteal cyst: Possible within right ovary: **Area of mixed echogenicity with vascul
arity seen in right ovary: 1.9 x 2.0 x 2.1 cm. 

Presence of subchorionic bleed: No

 

GESTATION / FETAL SURVEY

 

CRL: Not seen 

MSD: 0.70 (5 weeks/2 days)

Yolk Sac (normal less than 6mm): Not seen

IUP:  Appearance of gestational sac seen at this time.

 

Date of LMP: 11/22/2022

Beta HcG (if available):  Not available

 

IMPRESSION:

Small anechoic intrauterine cystic structure without evidence for yolk sac or fetal pole at this time
. This is thought to represent an early gestational sac with a positive beta hCG, however ectopic pre
gnancy and abnormal intrauterine pregnancy cannot be ruled out based on this exam alone. Follow-up [facility] pelvic ultrasound in 7-10 days and serial beta-hCG studies are recommended to en sure further deve
lopment of the fetus.

## 2023-01-07 ENCOUNTER — HOSPITAL ENCOUNTER (EMERGENCY)
Dept: HOSPITAL 47 - EC | Age: 23
Discharge: HOME | End: 2023-01-07
Payer: COMMERCIAL

## 2023-01-07 VITALS — TEMPERATURE: 97.1 F | RESPIRATION RATE: 16 BRPM

## 2023-01-07 VITALS — HEART RATE: 84 BPM | DIASTOLIC BLOOD PRESSURE: 59 MMHG | SYSTOLIC BLOOD PRESSURE: 106 MMHG

## 2023-01-07 DIAGNOSIS — Z3A.01: ICD-10-CM

## 2023-01-07 DIAGNOSIS — O99.511: ICD-10-CM

## 2023-01-07 DIAGNOSIS — K21.9: ICD-10-CM

## 2023-01-07 DIAGNOSIS — O26.891: Primary | ICD-10-CM

## 2023-01-07 DIAGNOSIS — Z79.899: ICD-10-CM

## 2023-01-07 DIAGNOSIS — J45.909: ICD-10-CM

## 2023-01-07 DIAGNOSIS — O99.611: ICD-10-CM

## 2023-01-07 DIAGNOSIS — Z91.018: ICD-10-CM

## 2023-01-07 LAB
ALBUMIN SERPL-MCNC: 4.3 G/DL (ref 3.5–5)
ALP SERPL-CCNC: 79 U/L (ref 38–126)
ALT SERPL-CCNC: 21 U/L (ref 4–34)
ANION GAP SERPL CALC-SCNC: 7 MMOL/L
APTT BLD: 23.2 SEC (ref 22–30)
AST SERPL-CCNC: 28 U/L (ref 14–36)
BASOPHILS # BLD AUTO: 0 K/UL (ref 0–0.2)
BASOPHILS NFR BLD AUTO: 0 %
BUN SERPL-SCNC: 11 MG/DL (ref 7–17)
CALCIUM SPEC-MCNC: 8.6 MG/DL (ref 8.4–10.2)
CHLORIDE SERPL-SCNC: 108 MMOL/L (ref 98–107)
CO2 SERPL-SCNC: 22 MMOL/L (ref 22–30)
EOSINOPHIL # BLD AUTO: 0.2 K/UL (ref 0–0.7)
EOSINOPHIL NFR BLD AUTO: 2 %
ERYTHROCYTE [DISTWIDTH] IN BLOOD BY AUTOMATED COUNT: 3.85 M/UL (ref 3.8–5.4)
ERYTHROCYTE [DISTWIDTH] IN BLOOD: 11.9 % (ref 11.5–15.5)
GLUCOSE SERPL-MCNC: 79 MG/DL (ref 74–99)
HCG SERPL-MCNC: (no result) MIU/ML
HCT VFR BLD AUTO: 35.6 % (ref 34–46)
HGB BLD-MCNC: 12.7 GM/DL (ref 11.4–16)
INR PPP: 1 (ref ?–1.2)
LYMPHOCYTES # SPEC AUTO: 1.9 K/UL (ref 1–4.8)
LYMPHOCYTES NFR SPEC AUTO: 15 %
MCH RBC QN AUTO: 32.9 PG (ref 25–35)
MCHC RBC AUTO-ENTMCNC: 35.6 G/DL (ref 31–37)
MCV RBC AUTO: 92.4 FL (ref 80–100)
MONOCYTES # BLD AUTO: 0.5 K/UL (ref 0–1)
MONOCYTES NFR BLD AUTO: 4 %
NEUTROPHILS # BLD AUTO: 10 K/UL (ref 1.3–7.7)
NEUTROPHILS NFR BLD AUTO: 78 %
PLATELET # BLD AUTO: 353 K/UL (ref 150–450)
POTASSIUM SERPL-SCNC: 3.9 MMOL/L (ref 3.5–5.1)
PROT SERPL-MCNC: 6.9 G/DL (ref 6.3–8.2)
PT BLD: 10.8 SEC (ref 9–12)
SODIUM SERPL-SCNC: 137 MMOL/L (ref 137–145)
WBC # BLD AUTO: 12.8 K/UL (ref 4–11)

## 2023-01-07 PROCEDURE — 85610 PROTHROMBIN TIME: CPT

## 2023-01-07 PROCEDURE — 86901 BLOOD TYPING SEROLOGIC RH(D): CPT

## 2023-01-07 PROCEDURE — 85730 THROMBOPLASTIN TIME PARTIAL: CPT

## 2023-01-07 PROCEDURE — 85025 COMPLETE CBC W/AUTO DIFF WBC: CPT

## 2023-01-07 PROCEDURE — 99285 EMERGENCY DEPT VISIT HI MDM: CPT

## 2023-01-07 PROCEDURE — 84702 CHORIONIC GONADOTROPIN TEST: CPT

## 2023-01-07 PROCEDURE — 76817 TRANSVAGINAL US OBSTETRIC: CPT

## 2023-01-07 PROCEDURE — 76801 OB US < 14 WKS SINGLE FETUS: CPT

## 2023-01-07 PROCEDURE — 36415 COLL VENOUS BLD VENIPUNCTURE: CPT

## 2023-01-07 PROCEDURE — 80053 COMPREHEN METABOLIC PANEL: CPT

## 2023-01-07 PROCEDURE — 86900 BLOOD TYPING SEROLOGIC ABO: CPT

## 2023-01-07 PROCEDURE — 86850 RBC ANTIBODY SCREEN: CPT
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
## 2023-01-19 ENCOUNTER — HOSPITAL ENCOUNTER (EMERGENCY)
Dept: HOSPITAL 47 - EC | Age: 23
Discharge: HOME | End: 2023-01-19
Payer: COMMERCIAL

## 2023-01-19 VITALS
DIASTOLIC BLOOD PRESSURE: 80 MMHG | SYSTOLIC BLOOD PRESSURE: 121 MMHG | HEART RATE: 77 BPM | TEMPERATURE: 98.1 F | RESPIRATION RATE: 18 BRPM

## 2023-01-19 DIAGNOSIS — O99.511: ICD-10-CM

## 2023-01-19 DIAGNOSIS — J45.909: ICD-10-CM

## 2023-01-19 DIAGNOSIS — Z91.048: ICD-10-CM

## 2023-01-19 DIAGNOSIS — Z79.899: ICD-10-CM

## 2023-01-19 DIAGNOSIS — O20.0: Primary | ICD-10-CM

## 2023-01-19 DIAGNOSIS — Z3A.08: ICD-10-CM

## 2023-01-19 LAB
ALBUMIN SERPL-MCNC: 3.9 G/DL (ref 3.5–5)
ALP SERPL-CCNC: 77 U/L (ref 38–126)
ALT SERPL-CCNC: 34 U/L (ref 4–34)
ANION GAP SERPL CALC-SCNC: 7 MMOL/L
AST SERPL-CCNC: 20 U/L (ref 14–36)
BASOPHILS # BLD AUTO: 0 K/UL (ref 0–0.2)
BASOPHILS NFR BLD AUTO: 0 %
BUN SERPL-SCNC: 9 MG/DL (ref 7–17)
CALCIUM SPEC-MCNC: 8.8 MG/DL (ref 8.4–10.2)
CHLORIDE SERPL-SCNC: 105 MMOL/L (ref 98–107)
CO2 SERPL-SCNC: 24 MMOL/L (ref 22–30)
EOSINOPHIL # BLD AUTO: 0.1 K/UL (ref 0–0.7)
EOSINOPHIL NFR BLD AUTO: 1 %
ERYTHROCYTE [DISTWIDTH] IN BLOOD BY AUTOMATED COUNT: 3.76 M/UL (ref 3.8–5.4)
ERYTHROCYTE [DISTWIDTH] IN BLOOD: 12 % (ref 11.5–15.5)
GLUCOSE SERPL-MCNC: 97 MG/DL (ref 74–99)
HCT VFR BLD AUTO: 34.3 % (ref 34–46)
HGB BLD-MCNC: 12 GM/DL (ref 11.4–16)
HYALINE CASTS UR QL AUTO: 3 /LPF (ref 0–2)
LYMPHOCYTES # SPEC AUTO: 1.6 K/UL (ref 1–4.8)
LYMPHOCYTES NFR SPEC AUTO: 12 %
MCH RBC QN AUTO: 32 PG (ref 25–35)
MCHC RBC AUTO-ENTMCNC: 35 G/DL (ref 31–37)
MCV RBC AUTO: 91.4 FL (ref 80–100)
MONOCYTES # BLD AUTO: 0.6 K/UL (ref 0–1)
MONOCYTES NFR BLD AUTO: 4 %
NEUTROPHILS # BLD AUTO: 11.1 K/UL (ref 1.3–7.7)
NEUTROPHILS NFR BLD AUTO: 82 %
PH UR: 6.5 [PH] (ref 5–8)
PLATELET # BLD AUTO: 394 K/UL (ref 150–450)
POTASSIUM SERPL-SCNC: 3.9 MMOL/L (ref 3.5–5.1)
PROT SERPL-MCNC: 6.7 G/DL (ref 6.3–8.2)
PROT UR QL: (no result)
RBC UR QL: 1 /HPF (ref 0–5)
SODIUM SERPL-SCNC: 136 MMOL/L (ref 137–145)
SP GR UR: 1.04 (ref 1–1.03)
SQUAMOUS UR QL AUTO: 7 /HPF (ref 0–4)
UROBILINOGEN UR QL STRIP: 3 MG/DL (ref ?–2)
WBC # BLD AUTO: 13.5 K/UL (ref 4–11)
WBC #/AREA URNS HPF: 3 /HPF (ref 0–5)

## 2023-01-19 PROCEDURE — 86140 C-REACTIVE PROTEIN: CPT

## 2023-01-19 PROCEDURE — 84702 CHORIONIC GONADOTROPIN TEST: CPT

## 2023-01-19 PROCEDURE — 81001 URINALYSIS AUTO W/SCOPE: CPT

## 2023-01-19 PROCEDURE — 99284 EMERGENCY DEPT VISIT MOD MDM: CPT

## 2023-01-19 PROCEDURE — 85025 COMPLETE CBC W/AUTO DIFF WBC: CPT

## 2023-01-19 PROCEDURE — 76801 OB US < 14 WKS SINGLE FETUS: CPT

## 2023-01-19 PROCEDURE — 96360 HYDRATION IV INFUSION INIT: CPT

## 2023-01-19 PROCEDURE — 80053 COMPREHEN METABOLIC PANEL: CPT

## 2023-01-19 PROCEDURE — 36415 COLL VENOUS BLD VENIPUNCTURE: CPT

## 2023-01-19 NOTE — US
EXAMINATION TYPE: Transabdominal

 

DATE OF EXAM: 1/19/2023 7:29 PM

 

COMPARISON: NONE

 

CLINICAL HISTORY: first trimester bleeding. Pelvic pain. nausea

 

EXAM PERFORMED:  Transabdominal (TA)

 

EXAM MEASUREMENTS:

 

GESTATIONAL AGE / DATING

 

Physician Established: Not yet established 

Dates by LMP: (8 weeks/2 days)  

** EDC: 08/29/23

Dates by First Scan:  (8 weeks/3 days)  

** EDC: 08/28/23

Dates by Current Scan for: (8 weeks/2 days)  

** EDC: 08/29/23

 

MATERNAL ANATOMY 

 

Uterus: 8.4 x 7.3 x 7.3cm

Right Ovary: 2.9 x 2.0 x 2.4cm

Left Ovary: 2.1 x 1.1 x 1.2cm

Post CDS / Adnexa: appears wnl

Presence of free fluid: no

Presence of corpus luteal cyst: yes, hypoechoic area right ovary = 1.9 x 2.2 x 1.7cm

Presence of subchorionic bleed: no

 

GESTATION / FETAL SURVEY

 

CRL: 1.7cm (8 weeks/2 days)

Yolk Sac (normal less than 6mm): 0.3cm

Heart Rate: 163 bpm

Rhythm:  Normal

IUP:  Viable IUP

 

Date of LMP: 11/22/22

Beta HcG (if available): 546075.0

 

 

IMPRESSION:

Single viable intrauterine pregnancy.
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
## 2023-04-07 ENCOUNTER — HOSPITAL ENCOUNTER (EMERGENCY)
Dept: HOSPITAL 47 - EC | Age: 23
Discharge: HOME | End: 2023-04-07
Payer: COMMERCIAL

## 2023-04-07 VITALS
DIASTOLIC BLOOD PRESSURE: 72 MMHG | TEMPERATURE: 97.8 F | HEART RATE: 85 BPM | SYSTOLIC BLOOD PRESSURE: 114 MMHG | RESPIRATION RATE: 20 BRPM

## 2023-04-07 DIAGNOSIS — O26.892: Primary | ICD-10-CM

## 2023-04-07 DIAGNOSIS — Z79.899: ICD-10-CM

## 2023-04-07 DIAGNOSIS — Z91.018: ICD-10-CM

## 2023-04-07 DIAGNOSIS — Z3A.19: ICD-10-CM

## 2023-04-07 DIAGNOSIS — J45.909: ICD-10-CM

## 2023-04-07 DIAGNOSIS — R10.9: ICD-10-CM

## 2023-04-07 DIAGNOSIS — Z79.82: ICD-10-CM

## 2023-04-07 LAB
ALBUMIN SERPL-MCNC: 3.7 G/DL (ref 3.5–5)
ALP SERPL-CCNC: 69 U/L (ref 38–126)
ALT SERPL-CCNC: 20 U/L (ref 4–34)
ANION GAP SERPL CALC-SCNC: 6 MMOL/L
AST SERPL-CCNC: 17 U/L (ref 14–36)
BASOPHILS # BLD AUTO: 0 K/UL (ref 0–0.2)
BASOPHILS NFR BLD AUTO: 0 %
BUN SERPL-SCNC: 7 MG/DL (ref 7–17)
CALCIUM SPEC-MCNC: 8.6 MG/DL (ref 8.4–10.2)
CHLORIDE SERPL-SCNC: 103 MMOL/L (ref 98–107)
CO2 SERPL-SCNC: 25 MMOL/L (ref 22–30)
EOSINOPHIL # BLD AUTO: 0.3 K/UL (ref 0–0.7)
EOSINOPHIL NFR BLD AUTO: 3 %
ERYTHROCYTE [DISTWIDTH] IN BLOOD BY AUTOMATED COUNT: 3.87 M/UL (ref 3.8–5.4)
ERYTHROCYTE [DISTWIDTH] IN BLOOD: 13.2 % (ref 11.5–15.5)
GLUCOSE SERPL-MCNC: 85 MG/DL (ref 74–99)
HCT VFR BLD AUTO: 36 % (ref 34–46)
HGB BLD-MCNC: 12.5 GM/DL (ref 11.4–16)
LYMPHOCYTES # SPEC AUTO: 2.3 K/UL (ref 1–4.8)
LYMPHOCYTES NFR SPEC AUTO: 17 %
MCH RBC QN AUTO: 32.3 PG (ref 25–35)
MCHC RBC AUTO-ENTMCNC: 34.7 G/DL (ref 31–37)
MCV RBC AUTO: 93.2 FL (ref 80–100)
MONOCYTES # BLD AUTO: 0.6 K/UL (ref 0–1)
MONOCYTES NFR BLD AUTO: 5 %
NEUTROPHILS # BLD AUTO: 10 K/UL (ref 1.3–7.7)
NEUTROPHILS NFR BLD AUTO: 75 %
PH UR: 7 [PH] (ref 5–8)
PLATELET # BLD AUTO: 272 K/UL (ref 150–450)
POTASSIUM SERPL-SCNC: 3.8 MMOL/L (ref 3.5–5.1)
PROT SERPL-MCNC: 6.4 G/DL (ref 6.3–8.2)
SODIUM SERPL-SCNC: 134 MMOL/L (ref 137–145)
SP GR UR: 1.01 (ref 1–1.03)
UROBILINOGEN UR QL STRIP: <2 MG/DL (ref ?–2)
WBC # BLD AUTO: 13.3 K/UL (ref 3.8–10.6)

## 2023-04-07 PROCEDURE — 76770 US EXAM ABDO BACK WALL COMP: CPT

## 2023-04-07 PROCEDURE — 99284 EMERGENCY DEPT VISIT MOD MDM: CPT

## 2023-04-07 PROCEDURE — 36415 COLL VENOUS BLD VENIPUNCTURE: CPT

## 2023-04-07 PROCEDURE — 81003 URINALYSIS AUTO W/O SCOPE: CPT

## 2023-04-07 PROCEDURE — 76805 OB US >/= 14 WKS SNGL FETUS: CPT

## 2023-04-07 PROCEDURE — 80053 COMPREHEN METABOLIC PANEL: CPT

## 2023-04-07 PROCEDURE — 85025 COMPLETE CBC W/AUTO DIFF WBC: CPT

## 2023-04-07 PROCEDURE — 96360 HYDRATION IV INFUSION INIT: CPT

## 2023-04-07 NOTE — ED
General Adult HPI





- General


Chief complaint: OB/Uterine Contractions


Stated complaint: 19 weeks preg/abd pain


Time Seen by Provider: 23 19:50


Source: patient


Mode of arrival: ambulatory


Limitations: no limitations





- History of Present Illness


Initial comments: 


Patient is a 23-year-old female currently 19 weeks and 3 days pregnant 

presenting with chief complaint of abdominal pain.  She is complaining mainly of

left upper quadrant pain that started today.  Small amount of intermittent 

nausea, no vomiting or diarrhea.  No fevers or chills.  No dysuria or hematuria.

 No vaginal bleeding.  No chest pain, difficulty breathing, palpitations.  She 

is a .








- Related Data


                                Home Medications











 Medication  Instructions  Recorded  Confirmed


 


Prenatal Vit No.180/Iron/Folic 1 tab PO HS 21





[Prenatal Plus Tablet]   


 


Albuterol Sulfate [Albuterol 2 puff INHALATION RT-QID PRN 23





Sulfate Hfa]   


 


Amoxicillin 875 mg PO BID 23


 


Fluticasone Propion/Salmeterol 1 puff INHALATION RT-BID 23





[Wixela 250-50 Inhub]   


 


Ondansetron Odt [Zofran ODT] 8 mg PO TID PRN 23


 


Progesterone, Micronized 200 mg PO HS 23





[Progesterone]   


 


Aspirin [Adult Low Dose Aspirin EC] 81 mg PO DAILY 23











                                    Allergies











Allergy/AdvReac Type Severity Reaction Status Date / Time


 


poppyseed oil Allergy Severe Anaphylaxis Verified 23 19:46














Review of Systems


ROS Statement: 


Those systems with pertinent positive or pertinent negative responses have been 

documented in the HPI.





ROS Other: All systems not noted in ROS Statement are negative.





Past Medical History


Past Medical History: Asthma, GERD/Reflux, Musculoskeletal Disorder, Pneumonia


Additional Past Medical History / Comment(s): Degenerative Disc Disease,covid- 

antibody infusion, SVT, preclampsia


History of Any Multi-Drug Resistant Organisms: None Reported


Past Surgical History: Adenoidectomy, Cholecystectomy, Tonsillectomy


Additional Past Surgical History / Comment(s): Lejunior teeth removed, ganglion 

cyst on left hand removed, D&C,


Past Anesthesia/Blood Transfusion Reactions: Postoperative Nausea & Vomiting 

(PONV)


Past Psychological History: No Psychological Hx Reported


Smoking Status: Never smoker


Past Alcohol Use History: None Reported


Past Drug Use History: None Reported





- Past Family History


  ** Mother


Family Medical History: No Reported History





General Exam


Limitations: no limitations


General appearance: alert, in no apparent distress


Head exam: Present: atraumatic, normocephalic, normal inspection


Eye exam: Present: normal appearance


Neck exam: Present: normal inspection, full ROM


Respiratory exam: Present: normal lung sounds bilaterally.  Absent: respiratory 

distress, wheezes, rales, rhonchi, stridor


Cardiovascular Exam: Present: regular rate, normal rhythm, normal heart sounds. 

Absent: systolic murmur, diastolic murmur, rubs, gallop, clicks


GI/Abdominal exam: Present: soft.  Absent: distended, tenderness, guarding, 

rebound, rigid


Neurological exam: Present: alert, oriented X3, CN II-XII intact


Psychiatric exam: Present: normal affect, normal mood


Skin exam: Present: warm, dry, intact, normal color.  Absent: rash





Course


                                   Vital Signs











  23





  19:42


 


Temperature 97.8 F


 


Pulse Rate 85


 


Respiratory 20





Rate 


 


Blood Pressure 114/72


 


O2 Sat by Pulse 99





Oximetry 














Medical Decision Making





- Medical Decision Making


Was pt. sent in by a medical professional or institution (, PA, NP, urgent 

care, hospital, or nursing home...) When possible be specific


@  -No


Did you speak to anyone other than the patient for history (EMS, parent, family,

police, friend...)? What history was obtained from this source 


@  -No


Did you review nursing and triage notes (agree or disagree)?  Why? 


@  -I reviewed and agree with nursing and triage notes


Were old charts reviewed (outside hosp., previous admission, EMS record, old EK

G, old radiological studies, urgent care reports/EKG's, nursing home records)? 

Report findings 


@  -No old charts were reviewed


Differential Diagnosis (chest pain, altered mental status, abdominal pain women,

abdominal pain men, vaginal bleeding, weakness, fever, dyspnea, syncope, 

headache, dizziness, GI bleed, back pain, seizure, CVA, palpatations, mental 

health, musculoskeletal)? 


@  -MDM Differential Abdominal Pain Women:


Appendicitis, Cholecystitis, diverticulosis, ischemic bowel, pancreatitis, 

hepatitis, UTI, gastroenteritis, AAA, incarcerated hernia, bowel obstruction, 

constipation, inflammatory bowel, hepatitis, peptic ulcer disease, splenic 

infarction, perforated viscus, vulvitis, ovarian torsion, PID, kidney stone, 

placenta abruption... This is not meant to be an all-inclusive list


EKG interpreted by me (3pts min.).


@  -As above


X-rays interpreted by me (1pt min.).


@  -None done


CT interpreted by me (1pt min.).


@  -None done


U/S interpreted by me (1pt. min.).


@  -Negative ultrasound of kidneys ureters and bladder


Single viable intrauterine pregnancy, fetal heart rate 149


What testing was considered but not performed or refused? (CT, X-rays, U/S, 

labs)? Why?


@  -None


What meds were considered but not given or refused? Why?


@  -None


Did you discuss the management of the patient with other professionals 

(professionals i.e. , PA, NP, lab, RT, psych nurse, , , 

teacher, , )? Give summary


@  -No


Was smoking cessation discussed for >3mins.?


@  -No


Was critical care preformed (if so, how long)?


@  -No


Were there social determinants of health that impacted care today? How? 

(Homelessness, low income, unemployed, alcoholism, drug addiction, 

transportation, low edu. Level, literacy, decrease access to med. care, retirement, r

ehab)?


@  -No


Was there de-escalation of care discussed even if they declined (Discuss DNR or 

withdrawal of care, Hospice)? DNR status


@  -No


What co-morbidities impacted this encounter? (DM, HTN, Smoking, COPD, CAD, 

Cancer, CVA, ARF, Chemo, Hep., AIDS, mental health diagnosis, sleep apnea, 

morbid obesity)?


@  -None


Was patient admitted / discharged? Hospital course, mention meds given and 

route, prescriptions, significant lab abnormalities, going to OR and other 

pertinent info.


@  -Discharged.  Patient is a 23-year-old female 19 weeks and 3 days pregnant.  

She developed some left-sided abdominal pain that started today.  No vaginal 

bleeding.  Physical examination unremarkable.  Her lab work is essentially 

unremarkable.  Urine is negative.  Ultrasound shows single viable intrauterine 

pregnancy and negative ultrasound of the kidneys ureters and bladder.  Viral 

etiology patient is educated on these findings.  Instructed to follow-up with 

her OB/GYN. Follow-up with PCP.  Report back to ER with any new or worsening 

symptoms.  Discussed return parameters and answered all questions.  Patient 

conveyed verbal understanding and agreed to the plan.  I discussed this case in 

detail with my attending Dr. Huang


Undiagnosed new problem with uncertain prognosis?


@  -No


Drug Therapy requiring intensive monitoring for toxicity (Heparin, Nitro, 

Insulin, Cardizem)?


@  -No


Were any procedures done?


@  -No


Diagnosis/symptom?


@  -Abdominal pain during pregnancy


Acute, or Chronic, or Acute on Chronic?


@  -Acute


Uncomplicated (without systemic symptoms) or Complicated (systemic symptoms)?


@  -Uncomplicated


Side effects of treatment?


@  -No


Exacerbation, Progression, or Severe Exacerbation?


@  -No


Poses a threat to life or bodily function? How? (Chest pain, USA, MI, pneumonia,

PE, COPD, DKA, ARF, appy, cholecystitis, CVA, Diverticulitis, Homicidal, 

Suicidal, threat to staff... and all critical care pts)


@  -No








- Lab Data


Result diagrams: 


                                 23 20:11





                                 23 20:11


                                   Lab Results











  23 Range/Units





  20:11 20:11 20:11 


 


WBC  13.3 H    (3.8-10.6)  k/uL


 


RBC  3.87    (3.80-5.40)  m/uL


 


Hgb  12.5    (11.4-16.0)  gm/dL


 


Hct  36.0    (34.0-46.0)  %


 


MCV  93.2    (80.0-100.0)  fL


 


MCH  32.3    (25.0-35.0)  pg


 


MCHC  34.7    (31.0-37.0)  g/dL


 


RDW  13.2    (11.5-15.5)  %


 


Plt Count  272    (150-450)  k/uL


 


MPV  6.8    


 


Neutrophils %  75    %


 


Lymphocytes %  17    %


 


Monocytes %  5    %


 


Eosinophils %  3    %


 


Basophils %  0    %


 


Neutrophils #  10.0 H    (1.3-7.7)  k/uL


 


Lymphocytes #  2.3    (1.0-4.8)  k/uL


 


Monocytes #  0.6    (0-1.0)  k/uL


 


Eosinophils #  0.3    (0-0.7)  k/uL


 


Basophils #  0.0    (0-0.2)  k/uL


 


Sodium    134 L  (137-145)  mmol/L


 


Potassium    3.8  (3.5-5.1)  mmol/L


 


Chloride    103  ()  mmol/L


 


Carbon Dioxide    25  (22-30)  mmol/L


 


Anion Gap    6  mmol/L


 


BUN    7  (7-17)  mg/dL


 


Creatinine    0.47 L  (0.52-1.04)  mg/dL


 


Est GFR (CKD-EPI)AfAm    >90  (>60 ml/min/1.73 sqM)  


 


Est GFR (CKD-EPI)NonAf    >90  (>60 ml/min/1.73 sqM)  


 


Glucose    85  (74-99)  mg/dL


 


Calcium    8.6  (8.4-10.2)  mg/dL


 


Total Bilirubin    0.3  (0.2-1.3)  mg/dL


 


AST    17  (14-36)  U/L


 


ALT    20  (4-34)  U/L


 


Alkaline Phosphatase    69  ()  U/L


 


Total Protein    6.4  (6.3-8.2)  g/dL


 


Albumin    3.7  (3.5-5.0)  g/dL


 


Urine Color   Light Yellow   


 


Urine Appearance   Clear   (Clear)  


 


Urine pH   7.0   (5.0-8.0)  


 


Ur Specific Gravity   1.007   (1.001-1.035)  


 


Urine Protein   Negative   (Negative)  


 


Urine Glucose (UA)   Negative   (Negative)  


 


Urine Ketones   Negative   (Negative)  


 


Urine Blood   Negative   (Negative)  


 


Urine Nitrite   Negative   (Negative)  


 


Urine Bilirubin   Negative   (Negative)  


 


Urine Urobilinogen   <2.0   (<2.0)  mg/dL


 


Ur Leukocyte Esterase   Negative   (Negative)  














Disposition


Clinical Impression: 


 Abdominal pain during pregnancy





Disposition: HOME SELF-CARE


Condition: Good


Instructions (If sedation given, give patient instructions):  Abdominal Pain in 

Pregnancy (ED)


Additional Instructions: 


Follow up with OB/GYN.  Report back to ER with any new or worsening symptoms.


Is patient prescribed a controlled substance at d/c from ED?: No


Referrals: 


Damian Roldan DO [Primary Care Provider] - 1-2 days


Maribel Asencio DO [Family Provider] - 1-2 days


Time of Disposition: 21:39

## 2023-04-07 NOTE — US
EXAMINATION TYPE: US OB >= 14 wk fetus

 

DATE OF EXAM: 4/7/2023

 

COMPARISON: Most recent- 2/25/23

 

CLINICAL HISTORY: abdominal painL sided abd pain

 

TECHNIQUE:  Transabdominal (TA)

 

GESTATIONAL AGE / DATING

Physician Established: (19 weeks/3 days) 

** EDC:  8/29/23

Dates by Current Scan: (19 weeks/5 days) 

** EDC: 8/27/23 

Beta HCG (if available): Not available at this time

 

FETAL SURVEY

IUP:  Single

PLACENTA: Anterior     

PREVIA:  No Previa

** ESPERANZA: 16.9 cm Normal

CERVICAL LENGTH (transabdominal: norm > 3.0cm): 3.2 cm

 

FETAL BIOMETRY

PRESENTATION:  Vertex

FETAL LIE:  Longitudinal

BPD: 4.87 cm

**  20 weeks / 5 days

HC: 17.96 cm

**  20 weeks / 3 days

AC: 14.1 cm

**  19 weeks / 3 days

FL: 3.06 cm

**  19 weeks / 3 days

ESTIMATED FETAL WEIGHT IN GRAMS:  303 grams

ESTIMATED FETAL WEIGHT IN LBS/OZ:  0 lbs. 11 oz. 

WEIGHT PERCENTAGE BASED ON ESTABLISHED DATES:   56.8%

HC/AC: 1.27 Abnormal

FL/AC: 21.68 Normal 

HEART RATE:  149 bpm 

RHYTHM:  Normal

 

 

 

 

 

IMPRESSION:

Single viable intrauterine pregnancy.

## 2023-04-07 NOTE — US
EXAMINATION TYPE: US kidneys/renal and bladder

 

DATE OF EXAM: 4/7/2023

 

COMPARISON: NONE

 

CLINICAL HISTORY: L sided abdominal pain. Left sided abd pain x 3 hours

 

EXAM MEASUREMENTS:

 

Right Kidney:  10.1 x 5.7 x 4.9 cm

Left Kidney: 10.0 x 5.4 x 6.2 cm

 

 

 

Right Kidney: No hydronephrosis or masses seen  

Left Kidney: No hydronephrosis or masses seen  

Bladder: wnl

**Bilateral Jets seen: No

 

 

There is no evidence for hydronephrosis at this point in time.  No nephrolithiasis is seen.  No tremaine
s are identified.  The urinary bladder is anechoic.  Bilateral ureteral jets are seen.

 

 

 

IMPRESSION:

Negative

## 2023-05-11 ENCOUNTER — HOSPITAL ENCOUNTER (OUTPATIENT)
Dept: HOSPITAL 47 - FBPOP | Age: 23
Discharge: HOME | End: 2023-05-11
Attending: OBSTETRICS & GYNECOLOGY
Payer: COMMERCIAL

## 2023-05-11 VITALS
SYSTOLIC BLOOD PRESSURE: 118 MMHG | DIASTOLIC BLOOD PRESSURE: 66 MMHG | RESPIRATION RATE: 16 BRPM | TEMPERATURE: 98.2 F | HEART RATE: 75 BPM

## 2023-05-11 DIAGNOSIS — Z91.018: ICD-10-CM

## 2023-05-11 DIAGNOSIS — Z3A.24: ICD-10-CM

## 2023-05-11 DIAGNOSIS — O47.02: Primary | ICD-10-CM

## 2023-05-11 PROCEDURE — 99213 OFFICE O/P EST LOW 20 MIN: CPT

## 2023-05-11 PROCEDURE — 84112 EVAL AMNIOTIC FLUID PROTEIN: CPT

## 2023-05-16 ENCOUNTER — HOSPITAL ENCOUNTER (OUTPATIENT)
Dept: HOSPITAL 47 - FBPOP | Age: 23
Discharge: HOME | End: 2023-05-16
Attending: OBSTETRICS & GYNECOLOGY
Payer: COMMERCIAL

## 2023-05-16 VITALS
DIASTOLIC BLOOD PRESSURE: 69 MMHG | SYSTOLIC BLOOD PRESSURE: 126 MMHG | HEART RATE: 79 BPM | TEMPERATURE: 98.2 F | RESPIRATION RATE: 18 BRPM

## 2023-05-16 DIAGNOSIS — Z3A.25: ICD-10-CM

## 2023-05-16 DIAGNOSIS — O92.112: Primary | ICD-10-CM

## 2023-05-16 DIAGNOSIS — Z88.8: ICD-10-CM

## 2023-05-16 PROCEDURE — 99213 OFFICE O/P EST LOW 20 MIN: CPT

## 2023-05-17 NOTE — P.MSEPDOC
Presenting Problems





- Arrival Data


Date of Arrival on Unit: 23


Time of Arrival on Unit: 18:23


Mode of Transport: Ambulatory





- Complaint


OB-Reason for Admission/Chief Complaint: Other


Comment: Nipple pain, itching, burning





Prenatal Medical History





- Pregnancy Information


: 4


Para: 1


Term: 0


: 1


Abortions: Spontaneous or Elective: 1


Number of Living Children: 1





- Gestational Age


Gestational Age by TOM (wks/days): 25 Weeks and 0 Days





- Prenatal History


Comment: Hx PreE with first baby, following with [facility], on ASA





Review of Systems





- Review of Systems


Constitutional: No problems


Breast: Right, Left


ENT: No problems


Cardiovascular: No problems


Respiratory: No problems


Gastrointestinal: No problems


Genitourinary: No problems


Musculoskeletal: No problems


Neurological: No problems


Skin: No problems


Comment: nipple pain, itching, burning. No relief with Benedryl





Vital Signs





- Temperature


Temperature: 98.2 F


Temperature Source: Temporal Artery Scan





- Pulse


  ** Right Sitting Brachial


Pulse Rate: 79


Pulse Assessment Method: Automatic Cuff





- Respirations


Respiratory Rate: 18


Oxygen Delivery Method: Room Air


O2 Sat by Pulse Oximetry: 99





- Blood Pressure


  ** Right Arm Sitting


Blood Pressure: 126/69


Blood Pressure Mean: 88


Blood Pressure Source: Automatic Cuff





Medical Screen Scoring





- Fetal Assessment - Baby A


Baseline FHR: 140


Fetal Heart Rate - NICHD Category: Category I (Normal)





Physician Notification





- Physician Notified


Physician Notified Date: 23


Physician Notified Time: 18:45


Physician: Yuliya Howe Order Received: Yes





- Notification Comment


Comment: Dc Home. Follow up in the office with Dr Asencio as scheduled.





Maternal Fetal Triage Index





- Maternal Fetal Triage Index


Presenting for scheduled procedure w/no complaint: No





- Stat/Priority 1


Stat Priority 1: No





- Urgent/Priority 2


Urgent Priority 2: No





- Prompt/Priority 3


Prompt Priority 3: No





- Non-Urgent/Priority 4


Non-Urgent Priority 4: No





- Scheduled/Requesting Priority 5


Scheduled/Requesting Priority 5: Yes


Criteria Met for Priority 5: nipple pain, itching, burning. Per Dr Howe ok to 

try Clotrimazole as directed, A&D ointment or hypoallergic lotion. Follow up 

with Dr Asencio as scheduled.





Disposition





- Disposition


OB Disposition: Discharge to home


Discharge Date: 23


Discharge Time: 18:50


I agree with the RN Medical Screening Exam: Yes


Case reviewed; plan agreed upon as documented in EMR&OBIX.: Yes


Diagnosis: CRACKED NIPPLE ASSOCIATED WITH PREGNANCY, SECOND TRIMESTER

## 2023-06-19 ENCOUNTER — HOSPITAL ENCOUNTER (OUTPATIENT)
Dept: HOSPITAL 47 - FBPOP | Age: 23
Discharge: HOME | End: 2023-06-19
Attending: OBSTETRICS & GYNECOLOGY
Payer: COMMERCIAL

## 2023-06-19 VITALS
DIASTOLIC BLOOD PRESSURE: 73 MMHG | RESPIRATION RATE: 16 BRPM | TEMPERATURE: 97.7 F | HEART RATE: 96 BPM | SYSTOLIC BLOOD PRESSURE: 130 MMHG

## 2023-06-19 DIAGNOSIS — Z3A.29: ICD-10-CM

## 2023-06-19 DIAGNOSIS — Z91.018: ICD-10-CM

## 2023-06-19 DIAGNOSIS — O21.9: Primary | ICD-10-CM

## 2023-06-19 LAB
ALBUMIN SERPL-MCNC: 3.8 G/DL (ref 3.5–5)
ALP SERPL-CCNC: 130 U/L (ref 38–126)
ALT SERPL-CCNC: 21 U/L (ref 4–34)
ANION GAP SERPL CALC-SCNC: 7 MMOL/L
AST SERPL-CCNC: 25 U/L (ref 14–36)
BASOPHILS # BLD AUTO: 0 K/UL (ref 0–0.2)
BASOPHILS NFR BLD AUTO: 0 %
BUN SERPL-SCNC: 7 MG/DL (ref 7–17)
CALCIUM SPEC-MCNC: 8.6 MG/DL (ref 8.4–10.2)
CHLORIDE SERPL-SCNC: 103 MMOL/L (ref 98–107)
CO2 SERPL-SCNC: 23 MMOL/L (ref 22–30)
EOSINOPHIL # BLD AUTO: 0.2 K/UL (ref 0–0.7)
EOSINOPHIL NFR BLD AUTO: 1 %
ERYTHROCYTE [DISTWIDTH] IN BLOOD BY AUTOMATED COUNT: 4.08 M/UL (ref 3.8–5.4)
ERYTHROCYTE [DISTWIDTH] IN BLOOD: 12.8 % (ref 11.5–15.5)
GLUCOSE SERPL-MCNC: 77 MG/DL (ref 74–99)
HCT VFR BLD AUTO: 40.2 % (ref 34–46)
HGB BLD-MCNC: 13.5 GM/DL (ref 11.4–16)
LYMPHOCYTES # SPEC AUTO: 0.9 K/UL (ref 1–4.8)
LYMPHOCYTES NFR SPEC AUTO: 5 %
MCH RBC QN AUTO: 33.2 PG (ref 25–35)
MCHC RBC AUTO-ENTMCNC: 33.7 G/DL (ref 31–37)
MCV RBC AUTO: 98.7 FL (ref 80–100)
MONOCYTES # BLD AUTO: 0.5 K/UL (ref 0–1)
MONOCYTES NFR BLD AUTO: 3 %
NEUTROPHILS # BLD AUTO: 15.8 K/UL (ref 1.3–7.7)
NEUTROPHILS NFR BLD AUTO: 90 %
PH UR: 6.5 [PH] (ref 5–8)
PLATELET # BLD AUTO: 299 K/UL (ref 150–450)
POTASSIUM SERPL-SCNC: 4.1 MMOL/L (ref 3.5–5.1)
PROT SERPL-MCNC: 6.8 G/DL (ref 6.3–8.2)
SODIUM SERPL-SCNC: 133 MMOL/L (ref 137–145)
SP GR UR: 1.01 (ref 1–1.03)
UROBILINOGEN UR QL STRIP: <2 MG/DL (ref ?–2)
WBC # BLD AUTO: 17.5 K/UL (ref 3.8–10.6)

## 2023-06-19 PROCEDURE — 85025 COMPLETE CBC W/AUTO DIFF WBC: CPT

## 2023-06-19 PROCEDURE — 99214 OFFICE O/P EST MOD 30 MIN: CPT

## 2023-06-19 PROCEDURE — 96374 THER/PROPH/DIAG INJ IV PUSH: CPT

## 2023-06-19 PROCEDURE — 81003 URINALYSIS AUTO W/O SCOPE: CPT

## 2023-06-19 PROCEDURE — 80053 COMPREHEN METABOLIC PANEL: CPT

## 2023-06-19 PROCEDURE — 59025 FETAL NON-STRESS TEST: CPT

## 2023-06-20 NOTE — P.MSEPDOC
Presenting Problems





- Arrival Data


Date of Arrival on Unit: 23


Time of Arrival on Unit: 13:08


Mode of Transport: Ambulatory





- Complaint


OB-Reason for Admission/Chief Complaint: Acute Nausea/Vomiting


Comment: Nausea, stomach pains, diarhhea.





Prenatal Medical History





- Pregnancy Information


: 3


Para: 1


Term: 1


: 0


Abortions: Spontaneous or Elective: 1


Number of Living Children: 1





- Gestational Age


Gestational Age by TOM (wks/days): 29 Weeks and 6 Days





Review of Systems





- Review of Systems


Constitutional: Fatigue


Breast: No problems


ENT: No problems


Cardiovascular: No problems


Respiratory: No problems


Gastrointestinal: Diarrhea


Genitourinary: No problems


Musculoskeletal: No problems


Neurological: No problems


Skin: No problems





Vital Signs





- Temperature


Temperature: 97.7 F


Temperature Source: Oral





- Pulse


  ** Right Brachial


Pulse Rate: 96


Pulse Assessment Method: Automatic Cuff





- Respirations


Respiratory Rate: 16


Oxygen Delivery Method: Room Air


O2 Sat by Pulse Oximetry: 98





- Blood Pressure


  ** Right Arm


Blood Pressure: 130/73


Blood Pressure Mean: 92


Blood Pressure Source: Automatic Cuff





Medical Screen Scoring





- Fetal Assessment - Baby A


Baseline FHR: 140


Fetal Heart Rate - NICHD Category: Category I (Normal)


NST: Reactive





Physician Notification





- Physician Notified


Physician Notified Date: 23


Physician Notified Time: 12:29


Physician: Prosper Barillas


New Order Received: Yes





- Notification Comment


Comment: Dr. Barillas given report on pt. Pt c/o and pt hx. VS readback. Cat 1 

FHTs. Orders recieved to.  administer 1L of IV hydration. To collect and send 

CBC, CMP, UA. To call with results. Dr. Barillas readback results of labwork. 

Orders recieved to d/c to home.





Maternal Fetal Triage Index





- Non-Urgent/Priority 4


Non-Urgent Priority 4: Yes


Criteria Met for Priority 4: Nausea, stomach pains, diarrhea.





Disposition





- Disposition


OB Disposition: Discharge to home


Discharge Date: 23


Discharge Time: 13:48


I agree with the RN Medical Screening Exam: Yes


Case reviewed; plan agreed upon as documented in EMR&OBIX.: Yes


Diagnosis: PREGNANCY RELATED CONDITIONS, UNSPECIFIED, THIRD TRIMESTER

## 2023-07-02 NOTE — P.MSEPDOC
Presenting Problems





- Arrival Data


Date of Arrival on Unit: 23


Time of Arrival on Unit: 10:14


Mode of Transport: Ambulatory





- Complaint


OB-Reason for Admission/Chief Complaint: Rule Out PROM, Other


Comment: pt 24 2/7 weeks gestation arrived c/o a lot of vaginal mucus and 

cramping. pt called her doctor and was told to come here to be evaulated





Prenatal Medical History





- Pregnancy Information


: 3


Para: 1


Term: 0


: 1


Abortions: Spontaneous or Elective: 1


Number of Living Children: 1





- Gestational Age


Gestational Age by TOM (wks/days): 24 Weeks and 2 Days





Review of Systems





- Review of Systems


Constitutional: No problems


Breast: No problems


ENT: No problems


Cardiovascular: No problems


Respiratory: No problems


Gastrointestinal: No problems


Genitourinary: No problems


Musculoskeletal: No problems


Neurological: No problems


Skin: No problems





Vital Signs





- Temperature


Temperature: 98.2 F


Temperature Source: Oral





- Pulse


  ** Right Brachial


Pulse Rate: 75


Pulse Assessment Method: Automatic Cuff





- Respirations


Respiratory Rate: 16


Oxygen Delivery Method: Room Air


O2 Sat by Pulse Oximetry: 97





- Blood Pressure


  ** Right Arm


Blood Pressure: 118/66


Blood Pressure Mean: 83


Blood Pressure Source: Automatic Cuff





Medical Screen Scoring





- Cervical Exam


Dilation (cm): 0


Membranes: Intact





- Uterine Contractions


Frequency From (mins): 0


Frequency To (mins): 0





- Fetal Assessment - Baby A


Baseline FHR: 150


Fetal Heart Rate - NICHD Category: Category I (Normal)





Physician Notification





- Physician Notified


Physician Notified Date: 23


Physician Notified Time: 11:13


Physician: Maribel Asencio


New Order Received: Yes





- Notification Comment


Comment: may discharge to home with instructions





Maternal Fetal Triage Index





- Non-Urgent/Priority 4


Non-Urgent Priority 4: Yes


Criteria Met for Priority 4: pt 24 2/7 weeks c/o mucus discharge and cramping. 

FFN obtained and amnisure done and negative cervix closed and posterior with no 

blood or discharge noted on exam glove. no contractions noted. dr asencio states 

we dont have to send the ffn down to lab.to d/c it. and pt may be discharged to 

home and keep appointment with her doctor in [place] tomorrow





Disposition





- Disposition


OB Disposition: Discharge to home


Discharge Date: 23


Discharge Time: 11:30


I agree with the RN Medical Screening Exam: Yes


Case reviewed; plan agreed upon as documented in EMR&OBIX.: Yes


Diagnosis: FALSE LABOR, UNSPECIFIED